# Patient Record
Sex: MALE | Race: WHITE
[De-identification: names, ages, dates, MRNs, and addresses within clinical notes are randomized per-mention and may not be internally consistent; named-entity substitution may affect disease eponyms.]

---

## 2021-01-13 ENCOUNTER — HOSPITAL ENCOUNTER (INPATIENT)
Dept: HOSPITAL 47 - EC | Age: 21
LOS: 7 days | Discharge: HOME | DRG: 684 | End: 2021-01-20
Attending: HOSPITALIST | Admitting: HOSPITALIST
Payer: COMMERCIAL

## 2021-01-13 VITALS — BODY MASS INDEX: 29.2 KG/M2

## 2021-01-13 DIAGNOSIS — A08.4: ICD-10-CM

## 2021-01-13 DIAGNOSIS — Z20.822: ICD-10-CM

## 2021-01-13 DIAGNOSIS — T38.0X5A: ICD-10-CM

## 2021-01-13 DIAGNOSIS — F41.9: ICD-10-CM

## 2021-01-13 DIAGNOSIS — E87.6: ICD-10-CM

## 2021-01-13 DIAGNOSIS — K59.00: ICD-10-CM

## 2021-01-13 DIAGNOSIS — Z71.3: ICD-10-CM

## 2021-01-13 DIAGNOSIS — N17.0: Primary | ICD-10-CM

## 2021-01-13 DIAGNOSIS — F17.210: ICD-10-CM

## 2021-01-13 DIAGNOSIS — I10: ICD-10-CM

## 2021-01-13 LAB
ALBUMIN SERPL-MCNC: 4.4 G/DL (ref 3.5–5)
ALP SERPL-CCNC: 61 U/L (ref 38–126)
ALT SERPL-CCNC: 11 U/L (ref 4–49)
ANION GAP SERPL CALC-SCNC: 10 MMOL/L
AST SERPL-CCNC: 24 U/L (ref 17–59)
BASOPHILS # BLD AUTO: 0.1 K/UL (ref 0–0.2)
BASOPHILS NFR BLD AUTO: 0 %
BUN SERPL-SCNC: 15 MG/DL (ref 9–20)
CALCIUM SPEC-MCNC: 9.2 MG/DL (ref 8.4–10.2)
CHLORIDE SERPL-SCNC: 110 MMOL/L (ref 98–107)
CO2 SERPL-SCNC: 21 MMOL/L (ref 22–30)
EOSINOPHIL # BLD AUTO: 0.2 K/UL (ref 0–0.7)
EOSINOPHIL NFR BLD AUTO: 1 %
ERYTHROCYTE [DISTWIDTH] IN BLOOD BY AUTOMATED COUNT: 5 M/UL (ref 4.3–5.9)
ERYTHROCYTE [DISTWIDTH] IN BLOOD: 12 % (ref 11.5–15.5)
GLUCOSE SERPL-MCNC: 99 MG/DL (ref 74–99)
HCT VFR BLD AUTO: 45.1 % (ref 39–53)
HGB BLD-MCNC: 15.9 GM/DL (ref 13–17.5)
LIPASE SERPL-CCNC: 27 U/L (ref 23–300)
LYMPHOCYTES # SPEC AUTO: 1.3 K/UL (ref 1–4.8)
LYMPHOCYTES NFR SPEC AUTO: 10 %
MCH RBC QN AUTO: 31.8 PG (ref 25–35)
MCHC RBC AUTO-ENTMCNC: 35.3 G/DL (ref 31–37)
MCV RBC AUTO: 90.1 FL (ref 80–100)
MONOCYTES # BLD AUTO: 0.8 K/UL (ref 0–1)
MONOCYTES NFR BLD AUTO: 6 %
NEUTROPHILS # BLD AUTO: 10.8 K/UL (ref 1.3–7.7)
NEUTROPHILS NFR BLD AUTO: 82 %
PH UR: 6 [PH] (ref 5–8)
PLATELET # BLD AUTO: 194 K/UL (ref 150–450)
POTASSIUM SERPL-SCNC: 4 MMOL/L (ref 3.5–5.1)
PROT SERPL-MCNC: 7.4 G/DL (ref 6.3–8.2)
PROT UR QL: (no result)
RBC UR QL: 1 /HPF (ref 0–5)
SODIUM SERPL-SCNC: 141 MMOL/L (ref 137–145)
SP GR UR: 1.01 (ref 1–1.03)
UROBILINOGEN UR QL STRIP: <2 MG/DL (ref ?–2)
WBC # BLD AUTO: 13.3 K/UL (ref 4–11)
WBC # UR AUTO: 2 /HPF (ref 0–5)

## 2021-01-13 PROCEDURE — 77012 CT SCAN FOR NEEDLE BIOPSY: CPT

## 2021-01-13 PROCEDURE — 85610 PROTHROMBIN TIME: CPT

## 2021-01-13 PROCEDURE — 86334 IMMUNOFIX E-PHORESIS SERUM: CPT

## 2021-01-13 PROCEDURE — 99284 EMERGENCY DEPT VISIT MOD MDM: CPT

## 2021-01-13 PROCEDURE — 84156 ASSAY OF PROTEIN URINE: CPT

## 2021-01-13 PROCEDURE — 87340 HEPATITIS B SURFACE AG IA: CPT

## 2021-01-13 PROCEDURE — 80048 BASIC METABOLIC PNL TOTAL CA: CPT

## 2021-01-13 PROCEDURE — 36415 COLL VENOUS BLD VENIPUNCTURE: CPT

## 2021-01-13 PROCEDURE — 82570 ASSAY OF URINE CREATININE: CPT

## 2021-01-13 PROCEDURE — 86706 HEP B SURFACE ANTIBODY: CPT

## 2021-01-13 PROCEDURE — 86335 IMMUNFIX E-PHORSIS/URINE/CSF: CPT

## 2021-01-13 PROCEDURE — 86850 RBC ANTIBODY SCREEN: CPT

## 2021-01-13 PROCEDURE — 86255 FLUORESCENT ANTIBODY SCREEN: CPT

## 2021-01-13 PROCEDURE — 85025 COMPLETE CBC W/AUTO DIFF WBC: CPT

## 2021-01-13 PROCEDURE — 96360 HYDRATION IV INFUSION INIT: CPT

## 2021-01-13 PROCEDURE — 86039 ANTINUCLEAR ANTIBODIES (ANA): CPT

## 2021-01-13 PROCEDURE — 80053 COMPREHEN METABOLIC PANEL: CPT

## 2021-01-13 PROCEDURE — 96361 HYDRATE IV INFUSION ADD-ON: CPT

## 2021-01-13 PROCEDURE — 76380 CAT SCAN FOLLOW-UP STUDY: CPT

## 2021-01-13 PROCEDURE — 86225 DNA ANTIBODY NATIVE: CPT

## 2021-01-13 PROCEDURE — 84100 ASSAY OF PHOSPHORUS: CPT

## 2021-01-13 PROCEDURE — 86038 ANTINUCLEAR ANTIBODIES: CPT

## 2021-01-13 PROCEDURE — 83690 ASSAY OF LIPASE: CPT

## 2021-01-13 PROCEDURE — 76770 US EXAM ABDO BACK WALL COMP: CPT

## 2021-01-13 PROCEDURE — 86900 BLOOD TYPING SEROLOGIC ABO: CPT

## 2021-01-13 PROCEDURE — 83735 ASSAY OF MAGNESIUM: CPT

## 2021-01-13 PROCEDURE — 86803 HEPATITIS C AB TEST: CPT

## 2021-01-13 PROCEDURE — 86160 COMPLEMENT ANTIGEN: CPT

## 2021-01-13 PROCEDURE — 86901 BLOOD TYPING SEROLOGIC RH(D): CPT

## 2021-01-13 PROCEDURE — 81001 URINALYSIS AUTO W/SCOPE: CPT

## 2021-01-13 PROCEDURE — 84132 ASSAY OF SERUM POTASSIUM: CPT

## 2021-01-13 RX ADMIN — ACETAMINOPHEN PRN MG: 325 TABLET, FILM COATED ORAL at 23:04

## 2021-01-13 RX ADMIN — CEFAZOLIN SCH MLS/HR: 330 INJECTION, POWDER, FOR SOLUTION INTRAMUSCULAR; INTRAVENOUS at 18:03

## 2021-01-13 NOTE — ED
General Adult HPI





- General


Chief complaint: Back Pain/Injury


Stated complaint: Back Pressure/kidney issues


Time Seen by Provider: 01/13/21 15:44


Source: patient, RN notes reviewed


Mode of arrival: ambulatory


Limitations: no limitations





- History of Present Illness


Initial comments: 





20-year-old male presents to the emergency room for a pressure feeling in his 

mid back.  Patient states this started yesterday morning.  States he does not 

recall straining his back.  The only thing that may have caused it is getting in

and out of his friend's truck.  He denies pain worsening with movement.  He 

denies nausea vomiting diarrhea.  States he is hungry and has any nausea.  

Patient went to [facility] earlier today and was told he was in 

renal failure and to go home and follow-up with his doctor.  He reports they did

do a CAT scan.Patient has no other complaints at this time including shortness 

of breath, chest pain, abdominal pain, nausea or vomiting, headache, or visual 

changes.





- Related Data


                                Home Medications











 Medication  Instructions  Recorded  Confirmed


 


No Known Home Medications  01/13/21 01/13/21











                                    Allergies











Allergy/AdvReac Type Severity Reaction Status Date / Time


 


No Known Allergies Allergy   Verified 01/13/21 16:22














Review of Systems


ROS Statement: 


Those systems with pertinent positive or pertinent negative responses have been 

documented in the HPI.





ROS Other: All systems not noted in ROS Statement are negative.





Past Medical History


Past Medical History: No Reported History


History of Any Multi-Drug Resistant Organisms: None Reported


Past Surgical History: No Surgical Hx Reported


Past Psychological History: No Psychological Hx Reported


Smoking Status: Current every day smoker


Past Alcohol Use History: Occasional


Past Drug Use History: Marijuana





General Exam


Limitations: no limitations


General appearance: alert


Head exam: Present: atraumatic, normal inspection


Eye exam: Present: normal appearance, PERRL, EOMI.  Absent: scleral icterus, 

conjunctival injection


ENT exam: Present: normal exam, mucous membranes moist


Neck exam: Present: normal inspection, full ROM.  Absent: tenderness


Respiratory exam: Present: normal lung sounds bilaterally.  Absent: respiratory 

distress


Cardiovascular Exam: Present: regular rate, normal rhythm


GI/Abdominal exam: Present: soft, normal bowel sounds.  Absent: distended, 

tenderness, guarding, rebound, rigid


Back exam: Absent: CVA tenderness (R), CVA tenderness (L), paraspinal tenderness

(Patient does state the pain feels better when I press on the paraspinal 

muscles), vertebral tenderness





Course


                                   Vital Signs











  01/13/21 01/13/21





  15:38 18:04


 


Temperature 98 F 


 


Pulse Rate 82 85


 


Respiratory 18 16





Rate  


 


Blood Pressure 148/85 151/94


 


O2 Sat by Pulse 98 98





Oximetry  














Medical Decision Making





- Medical Decision Making





Vitals are stable.  White blood cell come 13.3.  Creatinine 1.9.  I did review 

previous records and creatinine earlier today was 1.8.  CT was negative.  Urin

alysis shows 2+ protein.  At this time patient will be admitted for further 

management of acute kidney injury.  Patient does not have a primary care doctor.

 I did speak with Jairo from [facility], he will accept this admission





- Lab Data


Result diagrams: 


                                 01/13/21 16:11





                                 01/13/21 16:11


                                   Lab Results











  01/13/21 01/13/21 01/13/21 Range/Units





  16:11 16:11 16:11 


 


WBC  13.3 H    (4.0-11.0)  k/uL


 


RBC  5.00    (4.30-5.90)  m/uL


 


Hgb  15.9    (13.0-17.5)  gm/dL


 


Hct  45.1    (39.0-53.0)  %


 


MCV  90.1    (80.0-100.0)  fL


 


MCH  31.8    (25.0-35.0)  pg


 


MCHC  35.3    (31.0-37.0)  g/dL


 


RDW  12.0    (11.5-15.5)  %


 


Plt Count  194    (150-450)  k/uL


 


MPV  9.0    


 


Neutrophils %  82    %


 


Lymphocytes %  10    %


 


Monocytes %  6    %


 


Eosinophils %  1    %


 


Basophils %  0    %


 


Neutrophils #  10.8 H    (1.3-7.7)  k/uL


 


Lymphocytes #  1.3    (1.0-4.8)  k/uL


 


Monocytes #  0.8    (0-1.0)  k/uL


 


Eosinophils #  0.2    (0-0.7)  k/uL


 


Basophils #  0.1    (0-0.2)  k/uL


 


Sodium    141  (137-145)  mmol/L


 


Potassium    4.0  (3.5-5.1)  mmol/L


 


Chloride    110 H  ()  mmol/L


 


Carbon Dioxide    21 L  (22-30)  mmol/L


 


Anion Gap    10  mmol/L


 


BUN    15  (9-20)  mg/dL


 


Creatinine    1.90 H  (0.66-1.25)  mg/dL


 


Est GFR (CKD-EPI)AfAm    58  (>60 ml/min/1.73 sqM)  


 


Est GFR (CKD-EPI)NonAf    50  (>60 ml/min/1.73 sqM)  


 


Glucose    99  (74-99)  mg/dL


 


Calcium    9.2  (8.4-10.2)  mg/dL


 


Total Bilirubin    1.1  (0.2-1.3)  mg/dL


 


AST    24  (17-59)  U/L


 


ALT    11  (4-49)  U/L


 


Alkaline Phosphatase    61  ()  U/L


 


Total Protein    7.4  (6.3-8.2)  g/dL


 


Albumin    4.4  (3.5-5.0)  g/dL


 


Lipase    27  ()  U/L


 


Urine Color   Light Yellow   


 


Urine Appearance   Clear   (Clear)  


 


Urine pH   6.0   (5.0-8.0)  


 


Ur Specific Gravity   1.006   (1.001-1.035)  


 


Urine Protein   2+ H   (Negative)  


 


Urine Glucose (UA)   Negative   (Negative)  


 


Urine Ketones   Trace H   (Negative)  


 


Urine Blood   Trace H   (Negative)  


 


Urine Nitrite   Negative   (Negative)  


 


Urine Bilirubin   Negative   (Negative)  


 


Urine Urobilinogen   <2.0   (<2.0)  mg/dL


 


Ur Leukocyte Esterase   Negative   (Negative)  


 


Urine RBC   1   (0-5)  /hpf


 


Urine WBC   2   (0-5)  /hpf


 


Urine Bacteria   Rare H   (None)  /hpf














Disposition


Clinical Impression: 


 KENNY (acute kidney injury)





Disposition: ADMITTED AS IP TO THIS HOSP


Condition: Fair


Is patient prescribed a controlled substance at d/c from ED?: No


Time of Disposition: 17:08

## 2021-01-14 LAB — PROT/CREAT UR-RTO: 1.85

## 2021-01-14 RX ADMIN — ONDANSETRON PRN MG: 2 INJECTION INTRAMUSCULAR; INTRAVENOUS at 08:14

## 2021-01-14 RX ADMIN — CEFAZOLIN SCH: 330 INJECTION, POWDER, FOR SOLUTION INTRAMUSCULAR; INTRAVENOUS at 18:27

## 2021-01-14 RX ADMIN — MORPHINE SULFATE PRN MG: 4 INJECTION, SOLUTION INTRAMUSCULAR; INTRAVENOUS at 11:13

## 2021-01-14 RX ADMIN — MORPHINE SULFATE PRN MG: 4 INJECTION, SOLUTION INTRAMUSCULAR; INTRAVENOUS at 21:54

## 2021-01-14 RX ADMIN — FAMOTIDINE SCH MG: 10 INJECTION, SOLUTION INTRAVENOUS at 20:23

## 2021-01-14 RX ADMIN — CEFAZOLIN SCH MLS/HR: 330 INJECTION, POWDER, FOR SOLUTION INTRAMUSCULAR; INTRAVENOUS at 06:30

## 2021-01-14 RX ADMIN — ACETAMINOPHEN PRN MG: 325 TABLET, FILM COATED ORAL at 18:33

## 2021-01-14 RX ADMIN — CEFAZOLIN SCH: 330 INJECTION, POWDER, FOR SOLUTION INTRAMUSCULAR; INTRAVENOUS at 20:24

## 2021-01-14 RX ADMIN — HEPARIN SODIUM SCH UNIT: 5000 INJECTION, SOLUTION INTRAVENOUS; SUBCUTANEOUS at 20:23

## 2021-01-14 NOTE — US
EXAMINATION TYPE: US kidneys/renal and bladder

 

DATE OF EXAM: 1/14/2021

 

COMPARISON: NONE

 

CLINICAL HISTORY: RF. Abnormal renal labs

 

EXAM MEASUREMENTS:

 

Right Kidney:  11.8 x 6.5 x 6.5 cm

Left Kidney: 11.9 x 6.7 x 6.1 cm

 

 

 

 

Right Kidney: No evidence of hydro, appeared hyperechoic when compared to liver  

Left Kidney: No evidence of hydro  

Bladder: Empty- Pt voided just prior to exam

 

No hydronephrosis or nephrolithiasis. No solid or cystic renal mass. Bladder limited by incomplete di
stention.

 

 

IMPRESSION:

Increased echogenicity of the right kidney is nonspecific could be seen with medical renal disease co
rrelate clinically. No evidence of obstruction.

## 2021-01-14 NOTE — CONS
CONSULTATION



REASON FOR CONSULT:

Renal failure.



HISTORY OF PRESENT ILLNESS:

Patient is a 20-year-old male with no significant past medical history of any kidney

disease.  He was admitted to the hospital with a history of back pain.  He actually

presented to [facility] yesterday with the same complaints.  He had a CT

of the abdomen done which did not reveal any acute findings.  At that time patient was

noted to have an elevated creatinine, and he was asked to follow up with his regular

doctor as outpatient.  The patient did admit to taking Motrin for about 2 days prior to

admission.  He did have some constipation.  He denied any previous kidney-related

issues.  The patient does not see a physician on a regular basis.  However, he was

never informed of any abnormality in the urine or his labs.



This admission patient is noted to have a creatinine of 1.9, and he states that it was

1.9 yesterday at [facility], too. The patient states he was not given any IV fluids

over there.  He has been on IV fluids since admission yesterday.  UA shows 2+ protein,

trace blood and ketones.  Patient's blood pressure has not been low; in fact, it is

slightly on the higher side.  There is no prior history of hypertension.  No

significant WBCs noted on his urinalysis.



No history of joint pains.  No rashes.  No history of connective tissue diseases

previously or in the family.



PAST MEDICAL HISTORY:

None.



PAST SURGICAL HISTORY:

None.



SOCIAL HISTORY:

Positive for smoking as well as marijuana smoking.



MEDICATIONS:

None prior to admission.



REVIEW OF SYSTEMS:

As per HPI.  Other systems negative.



ALLERGIES:

NONE.



PHYSICAL EXAMINATION:

Patient is comfortable, awake, alert, oriented x3, not in any acute distress.  Blood

pressure was 150/84, heart rate 68 per minute. He is afebrile.

EXAMINATION OF THE HEART: S1 and S2.

EXAMINATION OF LUNGS: Bilateral breath sounds are heard.

ABDOMEN:  Soft, non-tender.

Examination of lower extremities shows no evidence of edema.

CNS exam is grossly intact.



LABS:

Labs show sodium 141, potassium 4.0, chloride 110. CO2 is 21, BUN 15, creatinine 1.9,

hemoglobin 15.9 g/dL. UA shows 2+ protein, no WBCs, trace blood.



ASSESSMENT:

1. Acute kidney injury, although no previous labs are available for comparison, and

    creatinine was the same per patient at [facility] yesterday.  I will

    continue with aggressive hydration and repeat labs in a.m., following which he

    could be discharged and follow up as outpatient.  Etiology for the acute kidney

    injury is most likely related to the use of NSAIDs.  However, we need to rule out

    underlying chronic kidney disease, given the degree of proteinuria.  I will check a

    protein/creatinine ratio and we will check a 24-hour urine for protein as

    outpatient.  Initial serologies will be ordered as well, and patient is advised to

    avoid use of any nonsteroidal anti-inflammatory agents.

2. Proteinuria with elevated creatinine in a 20-year-old male with a history of use of

    NSAIDs. Check protein/creatinine ratio and ultimately 24-hour urine, which can be

    done as outpatient.  Check baseline serologies.  Avoid NSAIDs. Patient will need

    followup as outpatient for possible kidney biopsy if his proteinuria and acute

    kidney injury persist.  I will also review the CT scan done at [facility].

3. Back pain, most likely musculoskeletal, currently improving.  There is no evidence

    of urinary tract infection.  No stones were seen on the CT scan done at [facility].  I will order an ultrasound of the kidneys.

4. Elevated blood pressure.  Continue to monitor for now.  May continue with the IV

    fluids.

5. Constipation; seems to have improved.



PLAN:

Increase IV fluids. Repeat labs in a.m.  Check ultrasound.  Check UA.  Check

protein/creatinine ratio.  Check baseline serologies.  Maintain IV hydration overnight

and repeat labs in a.m.  Patient will need close monitoring and followup as outpatient.



Thank you for this consultation.  Will continue to follow the patient with you during

his hospitalization.





MMODL / IJN: [id number] / Job#: [id number]

## 2021-01-14 NOTE — P.HPIM
History of Present Illness





This is a pleasant 20 years old male with no significant past medical history, 

he smokes marijuana and cigarettes.  Patient used to follow-up with Dr. [place]edo 

pediatrician, he hasn't been seen her for a while however she does not have 

adult PCP.  He presents because of back pain to [facility] yesterday he says

that his pain on both sides on the lower third of his back, felt about 4-5/10 in

severity and currently there are 2-3, he felt these pains well he was waking up 

from sleep.  At [facility] as per patient he had CT of the abdomen and 

pelvis which was unremarkable, there he was told he has acute kidney injury and 

they discharged him with recommendation to follow up as an outpatient however he

decided to come to this hospital at [facility].  On further questionnaire patient 

states he has diarrhea about 4-5 times per day he says that he had a 4 couple 

days, they are soft rather than watery associated with vomiting and , abdominal 

cramps,.  Periumbilical to about 6-7/10 in severity, nonradiating improved with 

defecation, now he is abdominal pain free.





Patient vitals are stable.  Labs showing WBCs of 13.3 K, less of CBC is 

unremarkable, creatinine up at 1.9.  Urinalysis showing 2+ protein, trace 

ketones, trace blood, rare bacteria.











Review of Systems





CONSTITUTIONAL: No fever, no malaise, no fatigue. 


HEENT: No recent visual problems or hearing problems. Denied any sore throat. 


CARDIOVASCULAR: No  orthopnea, PND, no palpitations, no syncope. 


PULMONARY: No shortness of breath, no cough, no hemoptysis. 


GASTROINTESTINAL: No abdominal distention. Normoactive bowel sounds. 


NEUROLOGICAL: No headaches, no weakness, no numbness. 


HEMATOLOGICAL: Denies any bleeding or petechiae. 


GENITOURINARY: Denies any burning micturition, frequency, or urgency. 


MUSCULOSKELETAL/RHEUMATOLOGICAL: Denies any joint pain, swelling, or any muscle 

pain. 


ENDOCRINE: Denies any polyuria or polydipsia.











Past Medical History


Past Medical History: No Reported History


History of Any Multi-Drug Resistant Organisms: None Reported


Past Surgical History: No Surgical Hx Reported


Past Psychological History: No Psychological Hx Reported


Smoking Status: Current some day smoker


Past Alcohol Use History: Occasional


Past Drug Use History: Marijuana





Medications and Allergies


                                Home Medications











 Medication  Instructions  Recorded  Confirmed  Type


 


No Known Home Medications  01/13/21 01/13/21 History








                                    Allergies











Allergy/AdvReac Type Severity Reaction Status Date / Time


 


No Known Allergies Allergy   Verified 01/13/21 16:22














Physical Exam


Vitals: 


                                   Vital Signs











  Temp Pulse Pulse Resp BP BP Pulse Ox


 


 01/14/21 09:00    68  18   


 


 01/14/21 08:08  98.1 F   68  18   150/84  98


 


 01/14/21 02:05    75    


 


 01/14/21 02:04  97.9 F   75  16   154/83  98


 


 01/13/21 21:06  98 F   88  16   151/84  98


 


 01/13/21 18:04   85   16  151/94   98


 


 01/13/21 15:38  98 F  82   18  148/85   98








                                Intake and Output











 01/13/21 01/14/21 01/14/21





 22:59 06:59 14:59


 


Other:   


 


  Voiding Method Toilet Toilet Toilet


 


  # Voids 1 1 


 


  Weight 95.254 kg  














GENERAL: The patient is alert and oriented x3, not in any acute distress. Well 

developed, well nourished. 


HEENT: Pupils are round and equally reacting to light. EOMI. No scleral icterus.

No conjunctival pallor. Normocephalic, atraumatic. No pharyngeal erythema. No 

thyromegaly. 


CARDIOVASCULAR: S1 and S2 present. No murmurs, rubs, or gallops. 


PULMONARY: Chest is clear to auscultation, no wheezing or crackles. 


ABDOMEN: Soft, nontender, nondistended, normoactive bowel sounds. No palpable 

organomegaly. 


-MUSCULOSKELETAL: No joint swelling or deformity.  Mild bilateral paraspinal 

lower back tenderness 


EXTREMITIES: No cyanosis, clubbing, or pedal edema. 


NEUROLOGICAL: Gross neurological examination did not reveal any focal deficits. 


SKIN: No rashes. No petechiae








Results


CBC & Chem 7: 


                                 01/13/21 16:11





                                 01/13/21 16:11


Labs: 


                  Abnormal Lab Results - Last 24 Hours (Table)











  01/13/21 01/13/21 01/13/21 Range/Units





  16:11 16:11 16:11 


 


WBC  13.3 H    (4.0-11.0)  k/uL


 


Neutrophils #  10.8 H    (1.3-7.7)  k/uL


 


Chloride    110 H  ()  mmol/L


 


Carbon Dioxide    21 L  (22-30)  mmol/L


 


Creatinine    1.90 H  (0.66-1.25)  mg/dL


 


Urine Protein   2+ H   (Negative)  


 


Urine Ketones   Trace H   (Negative)  


 


Urine Blood   Trace H   (Negative)  


 


Urine Bacteria   Rare H   (None)  /hpf














Thrombosis Risk Factor Assmnt





- Choose All That Apply


Any of the Below Risk Factors Present?: No


Other Risk Factors: No


Thrombosis Risk Factor Assessment Level: Very Low Risk





Assessment and Plan


Assessment: 





Possible acute gastroenteritis, mostly viral.


Acute kidney injury


Proteinuria











Plan: 





This is a pleasant 20 years old male who presents with acute kidney injury and 

proteinuria.  Continue with IV hydration at 150 mm per hour, monitor creatinine 

and electrolytes.  Also recommend to check coronavirus and C. diff, also we'll 

check stool studies.  Check procalcitonin.  Follow-up recommendation by 

nephrologist


Labs and medication were reviewed..  Continue same treatment.  Continue with 

symptomatic treatment.  Resume home medication.  Monitor lytes and vitals.  DVT 

and GI prophylaxis.  Further recommendations depends on the clinical course of 

the patient


DVT prophylaxis: Subcutaneous heparin


GI Prophylaxis: Pepcid

## 2021-01-15 LAB
ANION GAP SERPL CALC-SCNC: 5 MMOL/L
BASOPHILS # BLD AUTO: 0.1 K/UL (ref 0–0.2)
BASOPHILS NFR BLD AUTO: 0 %
BUN SERPL-SCNC: 20 MG/DL (ref 9–20)
CALCIUM SPEC-MCNC: 8.3 MG/DL (ref 8.4–10.2)
CHLORIDE SERPL-SCNC: 113 MMOL/L (ref 98–107)
CO2 SERPL-SCNC: 23 MMOL/L (ref 22–30)
DSDNA AB SER QL: NEGATIVE
DSDNA AB TITR SER: <1 IU/ML
EOSINOPHIL # BLD AUTO: 0.1 K/UL (ref 0–0.7)
EOSINOPHIL NFR BLD AUTO: 1 %
ERYTHROCYTE [DISTWIDTH] IN BLOOD BY AUTOMATED COUNT: 4.24 M/UL (ref 4.3–5.9)
ERYTHROCYTE [DISTWIDTH] IN BLOOD: 12 % (ref 11.5–15.5)
GLUCOSE SERPL-MCNC: 116 MG/DL (ref 74–99)
HBV SURFACE AB SERPL IA-ACNC: 3.5 MIU/ML
HCT VFR BLD AUTO: 39.2 % (ref 39–53)
HGB BLD-MCNC: 13.4 GM/DL (ref 13–17.5)
LYMPHOCYTES # SPEC AUTO: 1.2 K/UL (ref 1–4.8)
LYMPHOCYTES NFR SPEC AUTO: 10 %
MCH RBC QN AUTO: 31.7 PG (ref 25–35)
MCHC RBC AUTO-ENTMCNC: 34.3 G/DL (ref 31–37)
MCV RBC AUTO: 92.4 FL (ref 80–100)
MONOCYTES # BLD AUTO: 0.8 K/UL (ref 0–1)
MONOCYTES NFR BLD AUTO: 7 %
NEUTROPHILS # BLD AUTO: 10 K/UL (ref 1.3–7.7)
NEUTROPHILS NFR BLD AUTO: 82 %
PLATELET # BLD AUTO: 132 K/UL (ref 150–450)
POTASSIUM SERPL-SCNC: 4 MMOL/L (ref 3.5–5.1)
SODIUM SERPL-SCNC: 141 MMOL/L (ref 137–145)
WBC # BLD AUTO: 12.3 K/UL (ref 4–11)

## 2021-01-15 RX ADMIN — ACETAMINOPHEN PRN MG: 325 TABLET, FILM COATED ORAL at 02:12

## 2021-01-15 RX ADMIN — HEPARIN SODIUM SCH: 5000 INJECTION, SOLUTION INTRAVENOUS; SUBCUTANEOUS at 07:54

## 2021-01-15 RX ADMIN — MORPHINE SULFATE PRN MG: 4 INJECTION, SOLUTION INTRAMUSCULAR; INTRAVENOUS at 08:00

## 2021-01-15 RX ADMIN — CEFAZOLIN SCH MLS/HR: 330 INJECTION, POWDER, FOR SOLUTION INTRAMUSCULAR; INTRAVENOUS at 15:31

## 2021-01-15 RX ADMIN — FAMOTIDINE SCH MG: 10 INJECTION, SOLUTION INTRAVENOUS at 08:00

## 2021-01-15 RX ADMIN — CEFAZOLIN SCH MLS/HR: 330 INJECTION, POWDER, FOR SOLUTION INTRAMUSCULAR; INTRAVENOUS at 02:08

## 2021-01-15 RX ADMIN — CEFAZOLIN SCH MLS/HR: 330 INJECTION, POWDER, FOR SOLUTION INTRAMUSCULAR; INTRAVENOUS at 06:56

## 2021-01-15 RX ADMIN — MORPHINE SULFATE PRN MG: 4 INJECTION, SOLUTION INTRAMUSCULAR; INTRAVENOUS at 18:25

## 2021-01-15 NOTE — PN
PROGRESS NOTE



Patient is seen for followup for acute kidney injury.  He is maintained on IV fluids.

Labs from today show serum creatinine increased to 4.37 mg/dL.  The patient states he

has been voiding.  His blood pressure has been slightly on the high side.  The patient

stated that he had significant pain last night.  He has not had any significant chest

pain or cough.



PHYSICAL EXAMINATION:

On examination today, blood pressure was 158/100, heart rate 68 per minute.  Patient is

afebrile.

EXAMINATION OF THE HEART: S1, S2.

EXAMINATION OF THE LUNGS:  Decreased breath sounds at bases.

Abdomen is soft.  Minimal tenderness noted.

Examination of lower extremities shows no significant edema.

CNS exam is grossly intact.



LABS:

Labs show sodium of 141, potassium 4.0, chloride 113, CO2 is 23, BUN 20, creatinine

4.37.  Protein creatinine ratio noted to be 1.85.  So far CLIFF is positive and all other

serologies are negative.  ANCA is pending.



ASSESSMENT:

1. Acute kidney injury, most likely underlying acute GN.  Renal function has

    significantly worsened with creatinine up to 4.3 in 2 days.  The patient is

    maintained on IV fluids.  No evidence of obstruction on the ultrasound.  His UA

    shows 2+ protein.  Protein creatinine ratio is at about 1.8.  The patient needs to

    have a kidney biopsy. I will start him empirically on steroids in the meantime.  I

    will decrease the IV fluids to about 70 mL an hour as well.

2. Elevated blood pressure.  Add Norvasc 5 mg daily.  Expect improvement in blood

    pressure with decreasing the IV fluids.



PLAN:

Decrease IV fluids.  The patient needs to have a kidney biopsy as soon as possible.  We

will arrange for it, possibly on Monday.  In the meantime, add oral steroids.  Add

Norvasc for elevated blood pressure as well.





MMODL / IJN: [id number] / Job#: [id number]

## 2021-01-15 NOTE — P.PN
Subjective





This is a pleasant 20 years old male with no significant past medical history, 

he smokes marijuana and cigarettes.  Patient used to follow-up with Dr. Toledo 

pediatrician, he hasn't been seen her for a while however she does not have 

adult PCP.  He presents because of back pain to [facility] yesterday he says

that his pain on both sides on the lower third of his back, felt about 4-5/10 in

severity and currently there are 2-3, he felt these pains well he was waking up 

from sleep.  At [facility] as per patient he had CT of the abdomen and 

pelvis which was unremarkable, there he was told he has acute kidney injury and 

they discharged him with recommendation to follow up as an outpatient however he

decided to come to this hospital at [facility].  On further questionnaire patient 

states he has diarrhea about 4-5 times per day he says that he had a 4 couple 

days, they are soft rather than watery associated with vomiting and , abdominal 

cramps,.  Periumbilical to about 6-7/10 in severity, nonradiating improved with 

defecation, now he is abdominal pain free.


Patient vitals are stable.  Labs showing WBCs of 13.3 K, less of CBC is unre

markable, creatinine up at 1.9.  Urinalysis showing 2+ protein, trace ketones, 

trace blood, rare bacteria.





01/15/2021


Patient has no urinary complaints, no dysuria or urgency.  No suprapubic pain or

tenderness.  No abdominal pain.  He didn't have bowel movement since yesterday. 

He still complaining of from mild low back pain on both sides since yesterday.  

No diarrhea, no nausea vomiting, no abdominal pain.  No fever.  Blood pressure 

is 158/100, patient is afebrile.


WBCs trending down slowly to 12.3 K, creatinine is trending up 1.9  to 4.3 

today.  Urine from yesterday showing 2+ protein.  Urine c.  reatinine is 42, 

protein/creatinine ratio is within the reference range of 1.8.  PNA screen is 

positive.  Double-strand DNA antibodies is negative and anti-DNA antibodies less

than 1.  hepatitis BS antigen and hepatitis BS antibodies are nonreactive 


 renal ultrasound:increased echogenicity of the right kidney is nonspecific 

could be seen with medical renal disease, correlated clinically.  No evidence of

obstruction.  Nephrologist team on the case and follow the patient closely


                               Active Medications











Generic Name Dose Route Start Last Admin





  Trade Name Freq  PRN Reason Stop Dose Admin


 


Acetaminophen  650 mg  01/13/21 18:09  01/15/21 02:12





  Acetaminophen Tab 325 Mg Tab  PO   650 mg





  Q6HR PRN   Administration





  Fever and/ or Pain  


 


Famotidine  20 mg  01/14/21 21:00  01/15/21 08:00





  Famotidine 20 Mg/2 Ml Vial  IV   20 mg





  Q12HR KINGS   Administration


 


Heparin Sodium (Porcine)  5,000 unit  01/14/21 21:00  01/15/21 07:54





  Heparin Sodium,Porcine 5,000 Unit/Ml 1 Ml Vial  SQ   Not Given





  Q12HR KINGS  


 


Sodium Chloride  1,000 mls @ 150 mls/hr  01/14/21 11:45  01/15/21 06:56





  Saline 0.9%  IV   150 mls/hr





  .[id number] KINGS   Administration


 


Lorazepam  0.5 mg  01/13/21 18:08  01/15/21 04:10





  Lorazepam 2 Mg/Ml Inj  IV   0.5 mg





  Q6HR PRN   Administration





  Anxiety  


 


Morphine Sulfate  4 mg  01/13/21 17:09  01/15/21 08:00





  Morphine Sulfate 4 Mg/Ml Syringe  IV   4 mg





  Q4HR PRN   Administration





  Severe Pain  


 


Naloxone HCl  0.2 mg  01/13/21 17:09 





  Naloxone 0.4 Mg/Ml 1 Ml Vial  IV  





  Q2M PRN  





  Opioid Reversal  


 


Ondansetron HCl  4 mg  01/13/21 17:09  01/14/21 08:14





  Ondansetron 4 Mg/2 Ml Vial  IVP   4 mg





  Q8HR PRN   Administration





  Nausea And Vomiting  














Objective





- Vital Signs


Vital signs: 


                                   Vital Signs











Temp  97.3 F L  01/15/21 07:50


 


Pulse  68   01/15/21 08:49


 


Resp  16   01/15/21 08:49


 


BP  158/100   01/15/21 07:50


 


Pulse Ox  98   01/15/21 07:50








                                 Intake & Output











 01/14/21 01/15/21 01/15/21





 18:59 06:59 18:59


 


Intake Total 2610  


 


Output Total  300 


 


Balance 2610 -300 


 


Intake:   


 


  Intake, IV Titration 450  





  Amount   


 


    Sodium Chloride 0.9% 1, 450  





    000 ml @ 75 mls/hr IV .   





    [id number] KINGS Rx#:[id number]   


 


  Oral 2160  


 


Output:   


 


  Emesis  300 


 


Other:   


 


  Voiding Method Toilet Toilet Toilet


 


  # Voids 2 2 














- Labs


CBC & Chem 7: 


                                 01/15/21 09:19





                                 01/15/21 09:19


Labs: 


                  Abnormal Lab Results - Last 24 Hours (Table)











  01/14/21 01/14/21 01/15/21 Range/Units





  14:47 16:25 09:19 


 


WBC    12.3 H  (4.0-11.0)  k/uL


 


RBC    4.24 L  (4.30-5.90)  m/uL


 


Plt Count    132 L  (150-450)  k/uL


 


Neutrophils #    10.0 H  (1.3-7.7)  k/uL


 


Chloride     ()  mmol/L


 


Creatinine     (0.66-1.25)  mg/dL


 


Glucose     (74-99)  mg/dL


 


Calcium     (8.4-10.2)  mg/dL


 


U Random Total Protein   80 H   (<12)  mg/dL


 


CLIFF Screen  POSITIVE A    (NEGATIVE)  














  01/15/21 Range/Units





  09:19 


 


WBC   (4.0-11.0)  k/uL


 


RBC   (4.30-5.90)  m/uL


 


Plt Count   (150-450)  k/uL


 


Neutrophils #   (1.3-7.7)  k/uL


 


Chloride  113 H  ()  mmol/L


 


Creatinine  4.37 H  (0.66-1.25)  mg/dL


 


Glucose  116 H  (74-99)  mg/dL


 


Calcium  8.3 L  (8.4-10.2)  mg/dL


 


U Random Total Protein   (<12)  mg/dL


 


CLIFF Screen   (NEGATIVE)  














Assessment and Plan


Assessment: 








Acute kidney injury with worsening creatinine.  Could be related to recent NSAID

use


Proteinuria


Possible recent acute gastroenteritis, mostly viral.








Plan: 





This is a pleasant 20 years old male who presents with acute kidney injury and 

proteinuria.  Continue with IV hydration at 150 ml per hour, monitor creatinine 

and electrolytes Follow-up recommendation by nephrologist, continue with workup 

for proteinuria as per nephrologist recommendation 


Labs and medication were reviewed..  Continue same treatment.  Continue with 

symptomatic treatment.  Resume home medication.  Monitor lytes and vitals.  DVT 

and GI prophylaxis.  Further recommendations depends on the clinical course of 

the patient


DVT prophylaxis: Subcutaneous heparin


GI Prophylaxis: Pepcid

## 2021-01-16 LAB
ANION GAP SERPL CALC-SCNC: 8 MMOL/L
BASOPHILS # BLD AUTO: 0 K/UL (ref 0–0.2)
BASOPHILS NFR BLD AUTO: 0 %
BUN SERPL-SCNC: 21 MG/DL (ref 9–20)
CALCIUM SPEC-MCNC: 8.8 MG/DL (ref 8.4–10.2)
CHLORIDE SERPL-SCNC: 115 MMOL/L (ref 98–107)
CO2 SERPL-SCNC: 21 MMOL/L (ref 22–30)
EOSINOPHIL # BLD AUTO: 0 K/UL (ref 0–0.7)
EOSINOPHIL NFR BLD AUTO: 0 %
ERYTHROCYTE [DISTWIDTH] IN BLOOD BY AUTOMATED COUNT: 4.27 M/UL (ref 4.3–5.9)
ERYTHROCYTE [DISTWIDTH] IN BLOOD: 11.8 % (ref 11.5–15.5)
GLUCOSE SERPL-MCNC: 105 MG/DL (ref 74–99)
HCT VFR BLD AUTO: 38.5 % (ref 39–53)
HGB BLD-MCNC: 13.7 GM/DL (ref 13–17.5)
LYMPHOCYTES # SPEC AUTO: 1.2 K/UL (ref 1–4.8)
LYMPHOCYTES NFR SPEC AUTO: 11 %
MCH RBC QN AUTO: 32 PG (ref 25–35)
MCHC RBC AUTO-ENTMCNC: 35.5 G/DL (ref 31–37)
MCV RBC AUTO: 90.3 FL (ref 80–100)
MONOCYTES # BLD AUTO: 0.8 K/UL (ref 0–1)
MONOCYTES NFR BLD AUTO: 8 %
NEUTROPHILS # BLD AUTO: 8.9 K/UL (ref 1.3–7.7)
NEUTROPHILS NFR BLD AUTO: 80 %
PLATELET # BLD AUTO: 132 K/UL (ref 150–450)
POTASSIUM SERPL-SCNC: 4.2 MMOL/L (ref 3.5–5.1)
SODIUM SERPL-SCNC: 144 MMOL/L (ref 137–145)
WBC # BLD AUTO: 11.2 K/UL (ref 4–11)

## 2021-01-16 RX ADMIN — CEFAZOLIN SCH MLS/HR: 330 INJECTION, POWDER, FOR SOLUTION INTRAMUSCULAR; INTRAVENOUS at 22:13

## 2021-01-16 RX ADMIN — HEPARIN SODIUM SCH UNIT: 5000 INJECTION, SOLUTION INTRAVENOUS; SUBCUTANEOUS at 22:12

## 2021-01-16 RX ADMIN — FAMOTIDINE SCH MG: 10 INJECTION, SOLUTION INTRAVENOUS at 00:43

## 2021-01-16 RX ADMIN — PANTOPRAZOLE SODIUM SCH: 40 TABLET, DELAYED RELEASE ORAL at 09:31

## 2021-01-16 RX ADMIN — MORPHINE SULFATE PRN MG: 4 INJECTION, SOLUTION INTRAMUSCULAR; INTRAVENOUS at 00:42

## 2021-01-16 RX ADMIN — HEPARIN SODIUM SCH UNIT: 5000 INJECTION, SOLUTION INTRAVENOUS; SUBCUTANEOUS at 00:44

## 2021-01-16 RX ADMIN — HEPARIN SODIUM SCH UNIT: 5000 INJECTION, SOLUTION INTRAVENOUS; SUBCUTANEOUS at 07:45

## 2021-01-16 RX ADMIN — CEFAZOLIN SCH MLS/HR: 330 INJECTION, POWDER, FOR SOLUTION INTRAMUSCULAR; INTRAVENOUS at 04:03

## 2021-01-16 RX ADMIN — FAMOTIDINE SCH MG: 10 INJECTION, SOLUTION INTRAVENOUS at 07:45

## 2021-01-16 NOTE — P.PN
Subjective





Patient is seen in follow for acute kidney injury.  Creatinine up to 4.37 

yesterday.  CLIFF positive.  No gross hematuria.  Oral intake fair.  Kidney biopsy

pending.  Blood pressure on the higher side.





Vital signs are stable.


General: The patient appeared well nourished and normally developed. 


HEENT: Head exam is unremarkable. Neck is without jugular venous distension.


LUNGS: Breath sounds decreased.


HEART: Rate and Rhythm are regular. 


ABDOMEN: Soft, nontender.


EXTREMITITES: No edema.





Objective





- Vital Signs


Vital signs: 


                                   Vital Signs











Temp  98.3 F   01/16/21 03:00


 


Pulse  77   01/16/21 03:00


 


Resp  16   01/16/21 03:00


 


BP  163/109   01/16/21 03:00


 


Pulse Ox  99   01/16/21 03:00








                                 Intake & Output











 01/15/21 01/16/21 01/16/21





 18:59 06:59 18:59


 


Intake Total 400  


 


Balance 400  


 


Intake:   


 


  Oral 400  


 


Other:   


 


  Voiding Method Toilet Toilet 


 


  # Voids 2 5 














- Labs


CBC & Chem 7: 


                                 01/15/21 09:19





                                 01/15/21 09:19


Labs: 


                  Abnormal Lab Results - Last 24 Hours (Table)











  01/15/21 01/15/21 Range/Units





  09:19 09:19 


 


WBC  12.3 H   (4.0-11.0)  k/uL


 


RBC  4.24 L   (4.30-5.90)  m/uL


 


Plt Count  132 L   (150-450)  k/uL


 


Neutrophils #  10.0 H   (1.3-7.7)  k/uL


 


Chloride   113 H  ()  mmol/L


 


Creatinine   4.37 H  (0.66-1.25)  mg/dL


 


Glucose   116 H  (74-99)  mg/dL


 


Calcium   8.3 L  (8.4-10.2)  mg/dL














Assessment and Plan


Plan: 





Assessment:


1.  Acute kidney injury, concern for GN.  Creatinine 4.37 as of yesterday.  It 

was 1.9 on admission 01/13/2021.  Labs from today pending.  CLIFF positive.  UPC 

1.8 g. No hydronephrosis and kidney ultrasound.


2.  Benign hypertension.  Exacerbated by steroids.





Plan:


Maintain normal saline at 70 mL an hour.


Maintain prednisone (started 01/15/2021)


I will change Pepcid to Protonix.


Follow-up pending serologies.


Kidney biopsy scheduled for Monday.


DDAVP prior to biopsy.


Maintain amlodipine.


Add hydralazine 25 mg 3 times daily.

## 2021-01-16 NOTE — P.PN
Subjective





This is a pleasant 20 years old male with no significant past medical history, 

he smokes marijuana and cigarettes.  Patient used to follow-up with Dr. Toledo 

pediatrician, he hasn't been seen her for a while however she does not have 

adult PCP.  He presents because of back pain to [facility] yesterday he says

that his pain on both sides on the lower third of his back, felt about 4-5/10 in

severity and currently there are 2-3, he felt these pains well he was waking up 

from sleep.  At [facility] as per patient he had CT of the abdomen and 

pelvis which was unremarkable, there he was told he has acute kidney injury and 

they discharged him with recommendation to follow up as an outpatient however he

decided to come to this hospital at [facility].  On further questionnaire patient 

states he has diarrhea about 4-5 times per day he says that he had a 4 couple 

days, they are soft rather than watery associated with vomiting and , abdominal 

cramps,.  Periumbilical to about 6-7/10 in severity, nonradiating improved with 

defecation, now he is abdominal pain free.


Patient vitals are stable.  Labs showing WBCs of 13.3 K, less of CBC is unre

markable, creatinine up at 1.9.  Urinalysis showing 2+ protein, trace ketones, 

trace blood, rare bacteria.





01/15/2021


Patient has no urinary complaints, no dysuria or urgency.  No suprapubic pain or

tenderness.  No abdominal pain.  He didn't have bowel movement since yesterday. 

He still complaining of from mild low back pain on both sides since yesterday.  

No diarrhea, no nausea vomiting, no abdominal pain.  No fever.  Blood pressure 

is 158/100, patient is afebrile.


WBCs trending down slowly to 12.3 K, creatinine is trending up 1.9  to 4.3 

today.  Urine from yesterday showing 2+ protein.  Urine c.  reatinine is 42, 

protein/creatinine ratio is within the reference range of 1.8.  PNA screen is 

positive.  Double-strand DNA antibodies is negative and anti-DNA antibodies less

than 1.  hepatitis BS antigen and hepatitis BS antibodies are nonreactive 


 renal ultrasound:increased echogenicity of the right kidney is nonspecific 

could be seen with medical renal disease, correlated clinically.  No evidence of

obstruction.  Nephrologist team on the case and follow the patient closely





01/16/2021


Patient with known new complaint today, yesterday he was started on a prednisone

and since then his back pain is improved and patient is happy about that, he has

slow appetite but no abdominal pain, nausea urinary symptoms, blood pressure is 

on the high side, Norvasc was started yesterday and hydralazine is admitted 

today by nephrology team.


Also he is on normal saline at 70 mL/h


Labs today show a stable creatinine of 4.4 and patient is aware.  His WBC is 

11.2 but also is on steroids.


Given his protein in the urine and positive CLIFF acute glomerulonephritis is 

suspected and kidney biopsy is scheduled for Monday.











                               Active Medications











Generic Name Dose Route Start Last Admin





  Trade Name Freq  PRN Reason Stop Dose Admin


 


Acetaminophen  650 mg  01/13/21 18:09  01/15/21 02:12





  Acetaminophen Tab 325 Mg Tab  PO   650 mg





  Q6HR PRN   Administration





  Fever and/ or Pain  


 


Amlodipine Besylate  5 mg  01/16/21 09:00  01/16/21 07:45





  Amlodipine 5 Mg Tab  PO   5 mg





  DAILY KINGS   Administration


 


Heparin Sodium (Porcine)  5,000 unit  01/14/21 21:00  01/16/21 07:45





  Heparin Sodium,Porcine 5,000 Unit/Ml 1 Ml Vial  SQ   5,000 unit





  Q12HR KINGS   Administration


 


Hydralazine HCl  10 mg  01/16/21 08:18 





  Hydralazine Hcl 20 Mg/Ml 1 Ml Vial  IVP  





  Q6HR PRN  





  sbp >160  


 


Hydralazine HCl  25 mg  01/16/21 09:00  01/16/21 15:02





  Hydralazine Hcl 25 Mg Tab  PO   25 mg





  TID KINGS   Administration


 


Sodium Chloride  1,000 mls @ 70 mls/hr  01/14/21 11:45  01/16/21 04:03





  Saline 0.9%  IV   70 mls/hr





  .[id number] KINGS   Administration


 


Lorazepam  0.5 mg  01/13/21 18:08  01/16/21 07:59





  Lorazepam 2 Mg/Ml Inj  IV   0.5 mg





  Q6HR PRN   Administration





  Anxiety  


 


Morphine Sulfate  4 mg  01/13/21 17:09  01/16/21 00:42





  Morphine Sulfate 4 Mg/Ml Syringe  IV   4 mg





  Q4HR PRN   Administration





  Severe Pain  


 


Naloxone HCl  0.2 mg  01/13/21 17:09 





  Naloxone 0.4 Mg/Ml 1 Ml Vial  IV  





  Q2M PRN  





  Opioid Reversal  


 


Ondansetron HCl  4 mg  01/13/21 17:09  01/14/21 08:14





  Ondansetron 4 Mg/2 Ml Vial  IVP   4 mg





  Q8HR PRN   Administration





  Nausea And Vomiting  


 


Pantoprazole Sodium  40 mg  01/16/21 08:30  01/16/21 09:31





  Pantoprazole 40 Mg Tablet  PO   Not Given





  [id number]  


 


Prednisone  60 mg  01/15/21 14:30  01/16/21 07:45





  Prednisone 20 Mg Tab  PO   60 mg





  DAILY KINGS   Administration














Objective





- Vital Signs


Vital signs: 


                                   Vital Signs











Temp  97.5 F L  01/16/21 15:00


 


Pulse  92   01/16/21 15:00


 


Resp  16   01/16/21 15:00


 


BP  161/88   01/16/21 15:00


 


Pulse Ox  98   01/16/21 15:00








                                 Intake & Output











 01/16/21 01/16/21 01/17/21





 06:59 18:59 06:59


 


Intake Total  1000 


 


Balance  1000 


 


Intake:   


 


  Oral  1000 


 


Other:   


 


  Voiding Method Toilet Toilet 


 


  # Voids 5 3 














- Exam





GENERAL: The patient is alert and oriented x3, not in any acute distress. Well 

developed, well nourished. 


HEENT: Pupils are round and equally reacting to light. EOMI. No scleral icterus.

No conjunctival pallor. Normocephalic, atraumatic. No pharyngeal erythema. No 

thyromegaly. 


CARDIOVASCULAR: S1 and S2 present. No murmurs, rubs, or gallops. 


PULMONARY: Chest is clear to auscultation, no wheezing or crackles. 


ABDOMEN: Soft, nontender, nondistended, normoactive bowel sounds. No palpable 

organomegaly. 


MUSCULOSKELETAL: No joint swelling or deformity. 


EXTREMITIES: No cyanosis, clubbing, or pedal edema. 


NEUROLOGICAL: Gross neurological examination did not reveal any focal deficits. 


SKIN: No rashes. no petechiae.





- Labs


CBC & Chem 7: 


                                 01/16/21 07:45





                                 01/16/21 07:45


Labs: 


                  Abnormal Lab Results - Last 24 Hours (Table)











  01/16/21 01/16/21 Range/Units





  07:45 07:45 


 


WBC  11.2 H   (4.0-11.0)  k/uL


 


RBC  4.27 L   (4.30-5.90)  m/uL


 


Hct  38.5 L   (39.0-53.0)  %


 


Plt Count  132 L   (150-450)  k/uL


 


Neutrophils #  8.9 H   (1.3-7.7)  k/uL


 


Chloride   115 H  ()  mmol/L


 


Carbon Dioxide   21 L  (22-30)  mmol/L


 


BUN   21 H  (9-20)  mg/dL


 


Creatinine   4.48 H  (0.66-1.25)  mg/dL


 


Glucose   105 H  (74-99)  mg/dL














Assessment and Plan


Assessment: 








Acute kidney injury, mostly related to Glomerulonephritis 


Proteinuria


Possible recent acute gastroenteritis, mostly viral.


High blood pressure, hypertension.








Plan: 





This is a pleasant 20 years old male who presents with acute kidney injury and 

proteinuria.  Continue with IV hydration at 70ml per hour, monitor creatinine 

and electrolytes Follow-up recommendation by nephrologist, patient is scheduled 

for kidney biopsy on Monday recommendation .  Continue with prednisone as per 

nephrologist


Labs and medication were reviewed..  Continue same treatment.  Continue with 

symptomatic treatment.  Resume home medication.  Monitor lytes and vitals.  DVT 

and GI prophylaxis.  Further recommendations depends on the clinical course of 

the patient


DVT prophylaxis: Subcutaneous heparin


GI Prophylaxis: Pepcid

## 2021-01-17 LAB
ALBUMIN SERPL-MCNC: 3.6 G/DL (ref 3.5–5)
ALP SERPL-CCNC: 39 U/L (ref 38–126)
ALT SERPL-CCNC: 20 U/L (ref 4–49)
ANION GAP SERPL CALC-SCNC: 9 MMOL/L
AST SERPL-CCNC: 20 U/L (ref 17–59)
BASOPHILS # BLD AUTO: 0 K/UL (ref 0–0.2)
BASOPHILS NFR BLD AUTO: 0 %
BUN SERPL-SCNC: 24 MG/DL (ref 9–20)
CALCIUM SPEC-MCNC: 8.8 MG/DL (ref 8.4–10.2)
CHLORIDE SERPL-SCNC: 113 MMOL/L (ref 98–107)
CO2 SERPL-SCNC: 23 MMOL/L (ref 22–30)
EOSINOPHIL # BLD AUTO: 0.1 K/UL (ref 0–0.7)
EOSINOPHIL NFR BLD AUTO: 1 %
ERYTHROCYTE [DISTWIDTH] IN BLOOD BY AUTOMATED COUNT: 4.35 M/UL (ref 4.3–5.9)
ERYTHROCYTE [DISTWIDTH] IN BLOOD: 12 % (ref 11.5–15.5)
GLUCOSE SERPL-MCNC: 91 MG/DL (ref 74–99)
HCT VFR BLD AUTO: 39.3 % (ref 39–53)
HGB BLD-MCNC: 13.8 GM/DL (ref 13–17.5)
LYMPHOCYTES # SPEC AUTO: 1.9 K/UL (ref 1–4.8)
LYMPHOCYTES NFR SPEC AUTO: 17 %
MAGNESIUM SPEC-SCNC: 2 MG/DL (ref 1.6–2.3)
MCH RBC QN AUTO: 31.7 PG (ref 25–35)
MCHC RBC AUTO-ENTMCNC: 35.1 G/DL (ref 31–37)
MCV RBC AUTO: 90.4 FL (ref 80–100)
MONOCYTES # BLD AUTO: 0.9 K/UL (ref 0–1)
MONOCYTES NFR BLD AUTO: 8 %
NEUTROPHILS # BLD AUTO: 8 K/UL (ref 1.3–7.7)
NEUTROPHILS NFR BLD AUTO: 72 %
PLATELET # BLD AUTO: 143 K/UL (ref 150–450)
POTASSIUM SERPL-SCNC: 3.9 MMOL/L (ref 3.5–5.1)
PROT SERPL-MCNC: 6.2 G/DL (ref 6.3–8.2)
SODIUM SERPL-SCNC: 145 MMOL/L (ref 137–145)
WBC # BLD AUTO: 11.1 K/UL (ref 4–11)

## 2021-01-17 RX ADMIN — HEPARIN SODIUM SCH UNIT: 5000 INJECTION, SOLUTION INTRAVENOUS; SUBCUTANEOUS at 09:19

## 2021-01-17 RX ADMIN — CEFAZOLIN SCH: 330 INJECTION, POWDER, FOR SOLUTION INTRAMUSCULAR; INTRAVENOUS at 20:25

## 2021-01-17 RX ADMIN — ACETAMINOPHEN PRN MG: 325 TABLET, FILM COATED ORAL at 05:59

## 2021-01-17 RX ADMIN — PANTOPRAZOLE SODIUM SCH MG: 40 TABLET, DELAYED RELEASE ORAL at 06:56

## 2021-01-17 RX ADMIN — HEPARIN SODIUM SCH UNIT: 5000 INJECTION, SOLUTION INTRAVENOUS; SUBCUTANEOUS at 22:39

## 2021-01-17 RX ADMIN — MORPHINE SULFATE PRN MG: 4 INJECTION, SOLUTION INTRAMUSCULAR; INTRAVENOUS at 09:28

## 2021-01-17 NOTE — P.PN
Subjective





Patient is seen in follow for acute kidney injury.  Creatinine up to 4.48 

yesterday.  CLIFF positive.  No gross hematuria.  Oral intake fair.  Kidney biopsy

tomorrow.  Blood pressure better controlled.





Vital signs are stable.


General: The patient appeared well nourished and normally developed. 


HEENT: Head exam is unremarkable. Neck is without jugular venous distension.


LUNGS: Breath sounds decreased.


HEART: Rate and Rhythm are regular. 


ABDOMEN: Soft, nontender.


EXTREMITITES: No edema.





Objective





- Vital Signs


Vital signs: 


                                   Vital Signs











Temp  98.5 F   01/17/21 03:00


 


Pulse  67   01/17/21 03:00


 


Resp  16   01/17/21 03:00


 


BP  119/78   01/17/21 03:00


 


Pulse Ox  98   01/17/21 03:00








                                 Intake & Output











 01/16/21 01/17/21 01/17/21





 18:59 06:59 18:59


 


Intake Total 1000  


 


Balance 1000  


 


Intake:   


 


  Oral 1000  


 


Other:   


 


  Voiding Method Toilet  


 


  # Voids 3 1 














- Labs


CBC & Chem 7: 


                                 01/16/21 07:45





                                 01/16/21 07:45


Labs: 


                  Abnormal Lab Results - Last 24 Hours (Table)











  01/16/21 01/16/21 Range/Units





  07:45 07:45 


 


WBC  11.2 H   (4.0-11.0)  k/uL


 


RBC  4.27 L   (4.30-5.90)  m/uL


 


Hct  38.5 L   (39.0-53.0)  %


 


Plt Count  132 L   (150-450)  k/uL


 


Neutrophils #  8.9 H   (1.3-7.7)  k/uL


 


Chloride   115 H  ()  mmol/L


 


Carbon Dioxide   21 L  (22-30)  mmol/L


 


BUN   21 H  (9-20)  mg/dL


 


Creatinine   4.48 H  (0.66-1.25)  mg/dL


 


Glucose   105 H  (74-99)  mg/dL














Assessment and Plan


Plan: 





Assessment:


1.  Acute kidney injury, concern for GN.  Creatinine 4.48 as of yesterday.  It 

was 1.9 on admission 01/13/2021.  Labs from today pending.  CLIFF positive.  UPC 

1.8 g. No hydronephrosis and kidney ultrasound.


2.  Benign hypertension.  Exacerbated by steroids.  Better controlled.





Plan:


Maintain normal saline at 70 mL an hour.


Maintain prednisone (started 01/15/2021)


Maintain PPI.


Follow-up pending serologies.


Kidney biopsy scheduled for Monday.


DDAVP prior to biopsy.

## 2021-01-17 NOTE — P.PN
Subjective





This is a pleasant 20 years old male with no significant past medical history, 

he smokes marijuana and cigarettes.  Patient used to follow-up with Dr. Toledo 

pediatrician, he hasn't been seen her for a while however she does not have 

adult PCP.  He presents because of back pain to [facility] yesterday he says

that his pain on both sides on the lower third of his back, felt about 4-5/10 in

severity and currently there are 2-3, he felt these pains well he was waking up 

from sleep.  At [facility] as per patient he had CT of the abdomen and 

pelvis which was unremarkable, there he was told he has acute kidney injury and 

they discharged him with recommendation to follow up as an outpatient however he

decided to come to this hospital at [facility].  On further questionnaire patient 

states he has diarrhea about 4-5 times per day he says that he had a 4 couple 

days, they are soft rather than watery associated with vomiting and , abdominal 

cramps,.  Periumbilical to about 6-7/10 in severity, nonradiating improved with 

defecation, now he is abdominal pain free.


Patient vitals are stable.  Labs showing WBCs of 13.3 K, less of CBC is unre

markable, creatinine up at 1.9.  Urinalysis showing 2+ protein, trace ketones, 

trace blood, rare bacteria.





01/15/2021


Patient has no urinary complaints, no dysuria or urgency.  No suprapubic pain or

tenderness.  No abdominal pain.  He didn't have bowel movement since yesterday. 

He still complaining of from mild low back pain on both sides since yesterday.  

No diarrhea, no nausea vomiting, no abdominal pain.  No fever.  Blood pressure 

is 158/100, patient is afebrile.


WBCs trending down slowly to 12.3 K, creatinine is trending up 1.9  to 4.3 

today.  Urine from yesterday showing 2+ protein.  Urine c.  reatinine is 42, 

protein/creatinine ratio is within the reference range of 1.8.  PNA screen is 

positive.  Double-strand DNA antibodies is negative and anti-DNA antibodies less

than 1.  hepatitis BS antigen and hepatitis BS antibodies are nonreactive 


 renal ultrasound:increased echogenicity of the right kidney is nonspecific 

could be seen with medical renal disease, correlated clinically.  No evidence of

obstruction.  Nephrologist team on the case and follow the patient closely





01/16/2021


Patient with known new complaint today, yesterday he was started on a prednisone

and since then his back pain is improved and patient is happy about that, he has

slow appetite but no abdominal pain, nausea urinary symptoms, blood pressure is 

on the high side, Norvasc was started yesterday and hydralazine is admitted 

today by nephrology team.


Also he is on normal saline at 70 mL/h


Labs today show a stable creatinine of 4.4 and patient is aware.  His WBC is 

11.2 but also is on steroids.


Given his protein in the urine and positive CLIFF acute glomerulonephritis is 

suspected and kidney biopsy is scheduled for Monday.





01/17/2021


Patient was complaining of from low back pain and asking for stronger pain 

medication, Norco has been added, no other specific complaint.  Vitals are 

stable and blood pressure is 119/78


Labs from today are still pending, creatinine yesterday was 4.4.


Acute abdomen nephritis is suspected and patient is a scheduled for kidney 

biopsy tomorrow per nephrologist


Serology tests are pending CA) CA and immunofixation antibody and the serum and

urine 





Objective





- Vital Signs


Vital signs: 


                                   Vital Signs











Temp  98.5 F   01/17/21 03:00


 


Pulse  67   01/17/21 03:00


 


Resp  16   01/17/21 03:00


 


BP  119/78   01/17/21 03:00


 


Pulse Ox  98   01/17/21 03:00








                                 Intake & Output











 01/16/21 01/17/21 01/17/21





 18:59 06:59 18:59


 


Intake Total 1000  


 


Balance 1000  


 


Intake:   


 


  Oral 1000  


 


Other:   


 


  Voiding Method Toilet  


 


  # Voids 3 1 














- Exam





GENERAL: The patient is alert and oriented x3, not in any acute distress. Well 

developed, well nourished. 


HEENT: Pupils are round and equally reacting to light. EOMI. No scleral icterus.

No conjunctival pallor. Normocephalic, atraumatic. No pharyngeal erythema. No 

thyromegaly. 


CARDIOVASCULAR: S1 and S2 present. No murmurs, rubs, or gallops. 


PULMONARY: Chest is clear to auscultation, no wheezing or crackles. 


ABDOMEN: Soft, nontender, nondistended, normoactive bowel sounds. No palpable 

organomegaly. 


MUSCULOSKELETAL: No joint swelling or deformity. 


EXTREMITIES: No cyanosis, clubbing, or pedal edema. 


NEUROLOGICAL: Gross neurological examination did not reveal any focal deficits. 


SKIN: No rashes. no petechiae.





- Labs


CBC & Chem 7: 


                                 01/16/21 07:45





                                 01/16/21 07:45





Assessment and Plan


Assessment: 








Acute kidney injury, mostly related to Glomerulonephritis 


Proteinuria


Possible recent acute gastroenteritis, mostly viral.


High blood pressure, hypertension.








Plan: 





This is a pleasant 20 years old male who presents with acute kidney injury and 

proteinuria.  Continue with IV hydration at 70ml per hour, monitor creatinine 

and electrolytes Follow-up recommendation by nephrologist, patient is scheduled 

for kidney biopsy on Monday .   Continue with prednisone as per nephrologist


Labs and medication were reviewed..  Continue same treatment.  Continue with 

symptomatic treatment.  Resume home medication.  Monitor lytes and vitals.  DVT 

and GI prophylaxis.  Further recommendations depends on the clinical course of 

the patient


DVT prophylaxis: Subcutaneous heparin


GI Prophylaxis: Pepcid

## 2021-01-18 LAB
ALBUMIN SERPL-MCNC: 3.9 G/DL (ref 3.5–5)
ALP SERPL-CCNC: 55 U/L (ref 38–126)
ALT SERPL-CCNC: 23 U/L (ref 4–49)
ANION GAP SERPL CALC-SCNC: 11 MMOL/L
AST SERPL-CCNC: 17 U/L (ref 17–59)
BASOPHILS # BLD AUTO: 0 K/UL (ref 0–0.2)
BASOPHILS NFR BLD AUTO: 0 %
BUN SERPL-SCNC: 21 MG/DL (ref 9–20)
C-ANCA TITR SER: (no result) TITER
CALCIUM SPEC-MCNC: 8.9 MG/DL (ref 8.4–10.2)
CHLORIDE SERPL-SCNC: 111 MMOL/L (ref 98–107)
CO2 SERPL-SCNC: 23 MMOL/L (ref 22–30)
EOSINOPHIL # BLD AUTO: 0.1 K/UL (ref 0–0.7)
EOSINOPHIL NFR BLD AUTO: 1 %
ERYTHROCYTE [DISTWIDTH] IN BLOOD BY AUTOMATED COUNT: 4.6 M/UL (ref 4.3–5.9)
ERYTHROCYTE [DISTWIDTH] IN BLOOD: 11.9 % (ref 11.5–15.5)
GLUCOSE SERPL-MCNC: 97 MG/DL (ref 74–99)
HCT VFR BLD AUTO: 41.4 % (ref 39–53)
HGB BLD-MCNC: 14.6 GM/DL (ref 13–17.5)
INR PPP: 1.1 (ref ?–1.2)
LYMPHOCYTES # SPEC AUTO: 2.1 K/UL (ref 1–4.8)
LYMPHOCYTES NFR SPEC AUTO: 17 %
MAGNESIUM SPEC-SCNC: 1.7 MG/DL (ref 1.6–2.3)
MCH RBC QN AUTO: 31.7 PG (ref 25–35)
MCHC RBC AUTO-ENTMCNC: 35.3 G/DL (ref 31–37)
MCV RBC AUTO: 89.9 FL (ref 80–100)
MONOCYTES # BLD AUTO: 0.8 K/UL (ref 0–1)
MONOCYTES NFR BLD AUTO: 7 %
NEUTROPHILS # BLD AUTO: 8.7 K/UL (ref 1.3–7.7)
NEUTROPHILS NFR BLD AUTO: 73 %
PLATELET # BLD AUTO: 158 K/UL (ref 150–450)
POTASSIUM SERPL-SCNC: 3.4 MMOL/L (ref 3.5–5.1)
PROT SERPL-MCNC: 6.5 G/DL (ref 6.3–8.2)
PT BLD: 11.3 SEC (ref 9–12)
SODIUM SERPL-SCNC: 145 MMOL/L (ref 137–145)
WBC # BLD AUTO: 11.9 K/UL (ref 4–11)

## 2021-01-18 RX ADMIN — HEPARIN SODIUM SCH: 5000 INJECTION, SOLUTION INTRAVENOUS; SUBCUTANEOUS at 08:38

## 2021-01-18 RX ADMIN — CEFAZOLIN SCH: 330 INJECTION, POWDER, FOR SOLUTION INTRAMUSCULAR; INTRAVENOUS at 08:38

## 2021-01-18 RX ADMIN — CEFAZOLIN SCH MLS/HR: 330 INJECTION, POWDER, FOR SOLUTION INTRAMUSCULAR; INTRAVENOUS at 03:22

## 2021-01-18 RX ADMIN — MORPHINE SULFATE PRN MG: 2 INJECTION, SOLUTION INTRAMUSCULAR; INTRAVENOUS at 13:57

## 2021-01-18 RX ADMIN — MORPHINE SULFATE PRN MG: 2 INJECTION, SOLUTION INTRAMUSCULAR; INTRAVENOUS at 21:00

## 2021-01-18 RX ADMIN — HEPARIN SODIUM SCH: 5000 INJECTION, SOLUTION INTRAVENOUS; SUBCUTANEOUS at 21:19

## 2021-01-18 RX ADMIN — CEFAZOLIN SCH: 330 INJECTION, POWDER, FOR SOLUTION INTRAMUSCULAR; INTRAVENOUS at 19:28

## 2021-01-18 RX ADMIN — PANTOPRAZOLE SODIUM SCH MG: 40 TABLET, DELAYED RELEASE ORAL at 07:23

## 2021-01-18 RX ADMIN — HEPARIN SODIUM SCH UNIT: 5000 INJECTION, SOLUTION INTRAVENOUS; SUBCUTANEOUS at 21:01

## 2021-01-18 NOTE — P.PN
Subjective





Patient is seen in follow for acute kidney injury.  Creatinine peaked at 4.48 

this admission and was down to 3.21 as of yesterday.  CLIFF positive.  No gross 

hematuria.  Oral intake fair.  Kidney biopsy today.  





Vital signs are stable.


General: The patient appeared well nourished and normally developed. 


HEENT: Head exam is unremarkable. Neck is without jugular venous distension.


LUNGS: Breath sounds decreased.


HEART: Rate and Rhythm are regular. 


ABDOMEN: Soft, nontender.


EXTREMITITES: No edema.





Objective





- Vital Signs


Vital signs: 


                                   Vital Signs











Temp  98.3 F   01/18/21 03:00


 


Pulse  85   01/18/21 03:00


 


Resp  18   01/18/21 03:00


 


BP  160/92   01/18/21 03:00


 


Pulse Ox  97   01/18/21 03:00








                                 Intake & Output











 01/17/21 01/18/21 01/18/21





 18:59 06:59 18:59


 


Intake Total 1040  


 


Balance 1040  


 


Intake:   


 


  Intake, IV Titration 420  





  Amount   


 


    Sodium Chloride 0.9% 1, 420  





    000 ml @ 70 mls/hr IV .   





    [id number] [facility] Rx#:[id number]   


 


  Oral 620  


 


Other:   


 


  Voiding Method Toilet  


 


  # Voids  0 














- Labs


CBC & Chem 7: 


                                 01/17/21 09:31





                                 01/17/21 09:31


Labs: 


                  Abnormal Lab Results - Last 24 Hours (Table)











  01/17/21 01/17/21 Range/Units





  09:31 09:31 


 


WBC  11.1 H   (4.0-11.0)  k/uL


 


Plt Count  143 L   (150-450)  k/uL


 


Neutrophils #  8.0 H   (1.3-7.7)  k/uL


 


Chloride   113 H  ()  mmol/L


 


BUN   24 H  (9-20)  mg/dL


 


Creatinine   3.21 H  (0.66-1.25)  mg/dL


 


Phosphorus   5.5 H  (2.5-4.5)  mg/dL


 


Total Protein   6.2 L  (6.3-8.2)  g/dL














Assessment and Plan


Plan: 





Assessment:


1.  Acute kidney injury, concern for GN.  Creatinine peaked at 4.48 - 3.21 as of

yesterday.  It was 1.9 on admission 01/13/2021.  Labs from today pending.  CLIFF 

positive.  UPC 1.8 g. No hydronephrosis and kidney ultrasound.


2.  Benign hypertension.  Exacerbated by steroids.  Stable.





Plan:


Decreased normal saline to 50 mL an hour.


Maintain prednisone (started 01/15/2021)


Maintain PPI.


Follow-up pending serologies.


Kidney biopsy today.


DDAVP prior to biopsy.

## 2021-01-18 NOTE — CT
EXAMINATION TYPE: CT discontinued procedure

 

DATE OF EXAM: 1/18/2021

 

COMPARISON:

 

HISTORY: Acute kidney injury

 

CT DLP: 351 mGycm

Automated exposure control for dose reduction was used. Helical imaging through the kidneys in prepar
ation for kidney biopsy

 

FINDINGS: 

Biopsy was aborted due to inability to control patient's hypertension

 

IMPRESSION: 

DISCONTINUED RENAL BIOPSY

## 2021-01-18 NOTE — P.PN
Subjective





This is a pleasant 20 years old male with no significant past medical history, 

he smokes marijuana and cigarettes.  Patient used to follow-up with Dr. Toledo 

pediatrician, he hasn't been seen her for a while however she does not have 

adult PCP.  He presents because of back pain to [facility] yesterday he says

that his pain on both sides on the lower third of his back, felt about 4-5/10 in

severity and currently there are 2-3, he felt these pains well he was waking up 

from sleep.  At [facility] as per patient he had CT of the abdomen and 

pelvis which was unremarkable, there he was told he has acute kidney injury and 

they discharged him with recommendation to follow up as an outpatient however he

decided to come to this hospital at [facility].  On further questionnaire patient 

states he has diarrhea about 4-5 times per day he says that he had a 4 couple 

days, they are soft rather than watery associated with vomiting and , abdominal 

cramps,.  Periumbilical to about 6-7/10 in severity, nonradiating improved with 

defecation, now he is abdominal pain free.


Patient vitals are stable.  Labs showing WBCs of 13.3 K, less of CBC is unre

markable, creatinine up at 1.9.  Urinalysis showing 2+ protein, trace ketones, 

trace blood, rare bacteria.





01/15/2021


Patient has no urinary complaints, no dysuria or urgency.  No suprapubic pain or

tenderness.  No abdominal pain.  He didn't have bowel movement since yesterday. 

He still complaining of from mild low back pain on both sides since yesterday.  

No diarrhea, no nausea vomiting, no abdominal pain.  No fever.  Blood pressure 

is 158/100, patient is afebrile.


WBCs trending down slowly to 12.3 K, creatinine is trending up 1.9  to 4.3 

today.  Urine from yesterday showing 2+ protein.  Urine c.  reatinine is 42, 

protein/creatinine ratio is within the reference range of 1.8.  PNA screen is 

positive.  Double-strand DNA antibodies is negative and anti-DNA antibodies less

than 1.  hepatitis BS antigen and hepatitis BS antibodies are nonreactive 


 renal ultrasound:increased echogenicity of the right kidney is nonspecific 

could be seen with medical renal disease, correlated clinically.  No evidence of

obstruction.  Nephrologist team on the case and follow the patient closely





01/16/2021


Patient with known new complaint today, yesterday he was started on a prednisone

and since then his back pain is improved and patient is happy about that, he has

slow appetite but no abdominal pain, nausea urinary symptoms, blood pressure is 

on the high side, Norvasc was started yesterday and hydralazine is admitted 

today by nephrology team.


Also he is on normal saline at 70 mL/h


Labs today show a stable creatinine of 4.4 and patient is aware.  His WBC is 

11.2 but also is on steroids.


Given his protein in the urine and positive CLIFF acute glomerulonephritis is 

suspected and kidney biopsy is scheduled for Monday.





01/17/2021


Patient was complaining of from low back pain and asking for stronger pain 

medication, Norco has been added, no other specific complaint.  Vitals are 

stable and blood pressure is 119/78


Labs from today are still pending, creatinine yesterday was 4.4.


Acute abdomen nephritis is suspected and patient is a scheduled for kidney 

biopsy tomorrow per nephrologist


Serology tests are pending CA) CA and immunofixation antibody and the serum and

urine 





01/18/2021


Patient with minimal lower back pain, no other complaint.  Vitals stable, blood 

pressure 151/89, last eye was 138/70. 


Creatinine today is trending down to 2.4.  Potassium 3.4, WBC is 11.9 K 


 IV fluid was lowered to 50 mL/h per nephrologist, continue with prednisone, 

patient is a scheduled for kidney biopsy today and I talked to the patient and 

he looks agreeable to this plan.





Objective





- Vital Signs


Vital signs: 


                                   Vital Signs











Temp  97.8 F   01/18/21 08:07


 


Pulse  77   01/18/21 08:07


 


Resp  16   01/18/21 08:07


 


BP  151/89   01/18/21 08:07


 


Pulse Ox  98   01/18/21 08:07








                                 Intake & Output











 01/17/21 01/18/21 01/18/21





 18:59 06:59 18:59


 


Intake Total 1040  


 


Balance 1040  


 


Intake:   


 


  Intake, IV Titration 420  





  Amount   


 


    Sodium Chloride 0.9% 1, 420  





    000 ml @ 70 mls/hr IV .   





    [id number] [facility] Rx#:[id number]   


 


  Oral 620  


 


Other:   


 


  Voiding Method Toilet  


 


  # Voids  0 














- Exam





GENERAL: The patient is alert and oriented x3, not in any acute distress. Well 

developed, well nourished. 


HEENT: Pupils are round and equally reacting to light. EOMI. No scleral icterus.

No conjunctival pallor. Normocephalic, atraumatic. No pharyngeal erythema. No 

thyromegaly. 


CARDIOVASCULAR: S1 and S2 present. No murmurs, rubs, or gallops. 


PULMONARY: Chest is clear to auscultation, no wheezing or crackles. 


ABDOMEN: Soft, nontender, nondistended, normoactive bowel sounds. No palpable 

organomegaly. 


MUSCULOSKELETAL: No joint swelling or deformity. 


EXTREMITIES: No cyanosis, clubbing, or pedal edema. 


NEUROLOGICAL: Gross neurological examination did not reveal any focal deficits. 


SKIN: No rashes. no petechiae.





- Labs


CBC & Chem 7: 


                                 01/18/21 07:29





                                 01/18/21 07:29


Labs: 


                  Abnormal Lab Results - Last 24 Hours (Table)











  01/17/21 01/17/21 01/18/21 Range/Units





  09:31 09:31 07:29 


 


WBC  11.1 H   11.9 H  (4.0-11.0)  k/uL


 


Plt Count  143 L    (150-450)  k/uL


 


Neutrophils #  8.0 H   8.7 H  (1.3-7.7)  k/uL


 


Potassium     (3.5-5.1)  mmol/L


 


Chloride   113 H   ()  mmol/L


 


BUN   24 H   (9-20)  mg/dL


 


Creatinine   3.21 H   (0.66-1.25)  mg/dL


 


Phosphorus   5.5 H   (2.5-4.5)  mg/dL


 


Total Protein   6.2 L   (6.3-8.2)  g/dL














  01/18/21 Range/Units





  07:29 


 


WBC   (4.0-11.0)  k/uL


 


Plt Count   (150-450)  k/uL


 


Neutrophils #   (1.3-7.7)  k/uL


 


Potassium  3.4 L  (3.5-5.1)  mmol/L


 


Chloride  111 H  ()  mmol/L


 


BUN  21 H  (9-20)  mg/dL


 


Creatinine  2.46 H  (0.66-1.25)  mg/dL


 


Phosphorus   (2.5-4.5)  mg/dL


 


Total Protein   (6.3-8.2)  g/dL














Assessment and Plan


Assessment: 








Acute kidney injury, mostly related to Glomerulonephritis 


Proteinuria


Possible recent acute gastroenteritis, mostly viral.


High blood pressure, hypertension.








Plan: 





This is a pleasant 20 years old male who presents with acute kidney injury and 

proteinuria.  Continue with IV hydration at 70ml per hour, monitor creatinine 

and electrolytes Follow-up recommendation by nephrologist, patient is scheduled 

for kidney biopsy on Monday .   Continue with prednisone as per nephrologist


Labs and medication were reviewed..  Continue same treatment.  Continue with 

symptomatic treatment.  Resume home medication.  Monitor lytes and vitals.  DVT 

and GI prophylaxis.  Further recommendations depends on the clinical course of 

the patient


DVT prophylaxis: Subcutaneous heparin


GI Prophylaxis: Pepcid

## 2021-01-19 LAB
ANION GAP SERPL CALC-SCNC: 9 MMOL/L
BASOPHILS # BLD AUTO: 0 K/UL (ref 0–0.2)
BASOPHILS NFR BLD AUTO: 0 %
BUN SERPL-SCNC: 17 MG/DL (ref 9–20)
CALCIUM SPEC-MCNC: 8.7 MG/DL (ref 8.4–10.2)
CHLORIDE SERPL-SCNC: 109 MMOL/L (ref 98–107)
CO2 SERPL-SCNC: 26 MMOL/L (ref 22–30)
EOSINOPHIL # BLD AUTO: 0.1 K/UL (ref 0–0.7)
EOSINOPHIL NFR BLD AUTO: 1 %
ERYTHROCYTE [DISTWIDTH] IN BLOOD BY AUTOMATED COUNT: 4.76 M/UL (ref 4.3–5.9)
ERYTHROCYTE [DISTWIDTH] IN BLOOD: 12.7 % (ref 11.5–15.5)
GLUCOSE SERPL-MCNC: 135 MG/DL (ref 74–99)
HCT VFR BLD AUTO: 44.7 % (ref 39–53)
HGB BLD-MCNC: 14.9 GM/DL (ref 13–17.5)
LYMPHOCYTES # SPEC AUTO: 0.8 K/UL (ref 1–4.8)
LYMPHOCYTES NFR SPEC AUTO: 8 %
MAGNESIUM SPEC-SCNC: 1.8 MG/DL (ref 1.6–2.3)
MCH RBC QN AUTO: 31.4 PG (ref 25–35)
MCHC RBC AUTO-ENTMCNC: 33.5 G/DL (ref 31–37)
MCV RBC AUTO: 93.8 FL (ref 80–100)
MONOCYTES # BLD AUTO: 0.5 K/UL (ref 0–1)
MONOCYTES NFR BLD AUTO: 5 %
NEUTROPHILS # BLD AUTO: 8.5 K/UL (ref 1.3–7.7)
NEUTROPHILS NFR BLD AUTO: 85 %
PLATELET # BLD AUTO: 147 K/UL (ref 150–450)
POTASSIUM SERPL-SCNC: 3 MMOL/L (ref 3.5–5.1)
SODIUM SERPL-SCNC: 144 MMOL/L (ref 137–145)
WBC # BLD AUTO: 10 K/UL (ref 4–11)

## 2021-01-19 PROCEDURE — 0TB03ZX EXCISION OF RIGHT KIDNEY, PERCUTANEOUS APPROACH, DIAGNOSTIC: ICD-10-PCS

## 2021-01-19 RX ADMIN — POTASSIUM CHLORIDE STA MEQ: 20 TABLET, EXTENDED RELEASE ORAL at 14:57

## 2021-01-19 RX ADMIN — HEPARIN SODIUM SCH: 5000 INJECTION, SOLUTION INTRAVENOUS; SUBCUTANEOUS at 08:30

## 2021-01-19 RX ADMIN — CEFAZOLIN SCH MLS/HR: 330 INJECTION, POWDER, FOR SOLUTION INTRAMUSCULAR; INTRAVENOUS at 22:31

## 2021-01-19 RX ADMIN — ONDANSETRON PRN MG: 2 INJECTION INTRAMUSCULAR; INTRAVENOUS at 12:31

## 2021-01-19 RX ADMIN — ACETAMINOPHEN PRN MG: 325 TABLET, FILM COATED ORAL at 23:35

## 2021-01-19 RX ADMIN — PANTOPRAZOLE SODIUM SCH MG: 40 TABLET, DELAYED RELEASE ORAL at 08:39

## 2021-01-19 RX ADMIN — POTASSIUM CHLORIDE STA MEQ: 20 TABLET, EXTENDED RELEASE ORAL at 14:16

## 2021-01-19 RX ADMIN — CEFAZOLIN SCH: 330 INJECTION, POWDER, FOR SOLUTION INTRAMUSCULAR; INTRAVENOUS at 05:28

## 2021-01-19 NOTE — P.PN
Pt was notified of negative culture and wet prep results, per JON Quezada PA-C.  Also reviewed below pap and HPV results. To repeat exam and pap in one year.   Subjective





This is a pleasant 20 years old male with no significant past medical history, 

he smokes marijuana and cigarettes.  Patient used to follow-up with Dr. Toledo 

pediatrician, he hasn't been seen her for a while however she does not have 

adult PCP.  He presents because of back pain to [facility] yesterday he says

that his pain on both sides on the lower third of his back, felt about 4-5/10 in

severity and currently there are 2-3, he felt these pains well he was waking up 

from sleep.  At [facility] as per patient he had CT of the abdomen and 

pelvis which was unremarkable, there he was told he has acute kidney injury and 

they discharged him with recommendation to follow up as an outpatient however he

decided to come to this hospital at [facility].  On further questionnaire patient 

states he has diarrhea about 4-5 times per day he says that he had a 4 couple 

days, they are soft rather than watery associated with vomiting and , abdominal 

cramps,.  Periumbilical to about 6-7/10 in severity, nonradiating improved with 

defecation, now he is abdominal pain free.


Patient vitals are stable.  Labs showing WBCs of 13.3 K, less of CBC is unre

markable, creatinine up at 1.9.  Urinalysis showing 2+ protein, trace ketones, 

trace blood, rare bacteria.





01/15/2021


Patient has no urinary complaints, no dysuria or urgency.  No suprapubic pain or

tenderness.  No abdominal pain.  He didn't have bowel movement since yesterday. 

He still complaining of from mild low back pain on both sides since yesterday.  

No diarrhea, no nausea vomiting, no abdominal pain.  No fever.  Blood pressure 

is 158/100, patient is afebrile.


WBCs trending down slowly to 12.3 K, creatinine is trending up 1.9  to 4.3 

today.  Urine from yesterday showing 2+ protein.  Urine c.  reatinine is 42, 

protein/creatinine ratio is within the reference range of 1.8.  PNA screen is 

positive.  Double-strand DNA antibodies is negative and anti-DNA antibodies less

than 1.  hepatitis BS antigen and hepatitis BS antibodies are nonreactive 


 renal ultrasound:increased echogenicity of the right kidney is nonspecific 

could be seen with medical renal disease, correlated clinically.  No evidence of

obstruction.  Nephrologist team on the case and follow the patient closely





01/16/2021


Patient with known new complaint today, yesterday he was started on a prednisone

and since then his back pain is improved and patient is happy about that, he has

slow appetite but no abdominal pain, nausea urinary symptoms, blood pressure is 

on the high side, Norvasc was started yesterday and hydralazine is admitted 

today by nephrology team.


Also he is on normal saline at 70 mL/h


Labs today show a stable creatinine of 4.4 and patient is aware.  His WBC is 

11.2 but also is on steroids.


Given his protein in the urine and positive CLIFF acute glomerulonephritis is 

suspected and kidney biopsy is scheduled for Monday.





01/17/2021


Patient was complaining of from low back pain and asking for stronger pain 

medication, Norco has been added, no other specific complaint.  Vitals are 

stable and blood pressure is 119/78


Labs from today are still pending, creatinine yesterday was 4.4.


Acute abdomen nephritis is suspected and patient is a scheduled for kidney 

biopsy tomorrow per nephrologist


Serology tests are pending CA) CA and immunofixation antibody and the serum and

urine 





01/18/2021


Patient with minimal lower back pain, no other complaint.  Vitals stable, blood 

pressure 151/89, last eye was 138/70. 


Creatinine today is trending down to 2.4.  Potassium 3.4, WBC is 11.9 K 


 IV fluid was lowered to 50 mL/h per nephrologist, continue with prednisone, 

patient is a scheduled for kidney biopsy today and I talked to the patient and 

he looks agreeable to this plan.





01/19/2021


Patient was going for kidney biopsy yesterday but procedure was consult because 

of uncontrolled hypertension, but systolic blood pressure this morning is 140s 

to 130s, most likely there is some examined T elements as patient looks anxious,

patient remains on hydralazine and Norvasc, Ativan is provided for him for 

relaxation.  Patient is planned to go to another trial for kidney biopsy today. 

He is the biopsy to rule out glomerulonephritis.  However his creatinine is a 

trending down to 1.7


Patient currently is maintained on prednisone 60 mg daily as well as 

normocephalic 50 L/h.  He is on Norvasc 5 mg twice daily and hydralazine 75 mg 3

times a day.





Objective





- Vital Signs


Vital signs: 


                                   Vital Signs











Temp  98.1 F   01/19/21 08:26


 


Pulse  114 H  01/19/21 11:15


 


Resp  16   01/19/21 11:15


 


BP  134/69   01/19/21 11:15


 


Pulse Ox  95   01/19/21 11:15








                                 Intake & Output











 01/18/21 01/19/21 01/19/21





 18:59 06:59 18:59


 


Intake Total 470 240 


 


Balance 470 240 


 


Intake:   


 


  Intake, IV Titration 470  





  Amount   


 


    Desmopressin Acetate 29 50  





    mcg In Sodium Chloride 0.   





    9% 50 ml @ 200 mls/hr   





    IVPB ONCE ONE Rx#:   





    [id number]   


 


    Sodium Chloride 0.9% 1, 420  





    000 ml @ 50 mls/hr IV .   





    Q20H [facility] Rx#:[id number]   


 


  Oral  240 


 


Other:   


 


  Voiding Method Toilet Toilet Toilet


 


  # Voids 3 2 














- Exam





GENERAL: The patient is alert and oriented x3, not in any acute distress. Well 

developed, well nourished. 


HEENT: Pupils are round and equally reacting to light. EOMI. No scleral icterus.

No conjunctival pallor. Normocephalic, atraumatic. No pharyngeal erythema. No 

thyromegaly. 


CARDIOVASCULAR: S1 and S2 present. No murmurs, rubs, or gallops. 


PULMONARY: Chest is clear to auscultation, no wheezing or crackles. 


ABDOMEN: Soft, nontender, nondistended, normoactive bowel sounds. No palpable 

organomegaly. 


MUSCULOSKELETAL: No joint swelling or deformity. 


EXTREMITIES: No cyanosis, clubbing, or pedal edema. 


NEUROLOGICAL: Gross neurological examination did not reveal any focal deficits. 


SKIN: No rashes. no petechiae.





- Labs


CBC & Chem 7: 


                                 01/18/21 07:29





                                 01/19/21 03:33


Labs: 


                  Abnormal Lab Results - Last 24 Hours (Table)











  01/19/21 Range/Units





  03:33 


 


Potassium  3.0 L  (3.5-5.1)  mmol/L


 


Chloride  109 H  ()  mmol/L


 


Creatinine  1.77 H  (0.66-1.25)  mg/dL


 


Glucose  135 H  (74-99)  mg/dL














Assessment and Plan


Assessment: 








Acute kidney injury, mostly related to Glomerulonephritis 


Proteinuria


Possible recent acute gastroenteritis, mostly viral.


High blood pressure, hypertension.








Plan: 





This is a pleasant 20 years old male who presents with acute kidney injury and 

proteinuria.  Continue with IV hydration at 70ml per hour, monitor creatinine 

and electrolytes Follow-up recommendation by nephrologist, patient is scheduled 

for kidney biopsy on Monday .   Continue with prednisone as per nephrologist


Labs and medication were reviewed..  Continue same treatment.  Continue with 

symptomatic treatment.  Resume home medication.  Monitor lytes and vitals.  DVT 

and GI prophylaxis.  Further recommendations depends on the clinical course of 

the patient


DVT prophylaxis: Subcutaneous heparin


GI Prophylaxis: Pepcid

## 2021-01-19 NOTE — P.PN
Subjective





Patient is seen in follow for acute kidney injury.  Creatinine peaked at 4.48 

this admission and is down to 1.77 today.  He is maintained on steroids.  CLIFF 

positive.  No gross hematuria.  Oral intake fair.  Kidney biopsy today.  

Canceled yesterday due to high blood pressure.





Vital signs are stable.


General: The patient appeared well nourished and normally developed. 


HEENT: Head exam is unremarkable. Neck is without jugular venous distension.


LUNGS: Breath sounds decreased.


HEART: Rate and Rhythm are regular. 


ABDOMEN: Soft, nontender.


EXTREMITITES: No edema.





Objective





- Vital Signs


Vital signs: 


                                   Vital Signs











Temp  98.1 F   01/19/21 08:26


 


Pulse  83   01/19/21 08:26


 


Resp  16   01/19/21 08:26


 


BP  136/66   01/19/21 08:26


 


Pulse Ox  97   01/19/21 08:26








                                 Intake & Output











 01/18/21 01/19/21 01/19/21





 18:59 06:59 18:59


 


Intake Total 470 240 


 


Balance 470 240 


 


Intake:   


 


  Intake, IV Titration 470  





  Amount   


 


    Desmopressin Acetate 29 50  





    mcg In Sodium Chloride 0.   





    9% 50 ml @ 200 mls/hr   





    IVPB ONCE ONE Rx#:   





    [id number]   


 


    Sodium Chloride 0.9% 1, 420  





    000 ml @ 50 mls/hr IV .   





    Q20H [facility] Rx#:[id number]   


 


  Oral  240 


 


Other:   


 


  Voiding Method Toilet Toilet 


 


  # Voids 3 2 














- Labs


CBC & Chem 7: 


                                 01/18/21 07:29





                                 01/19/21 03:33


Labs: 


                  Abnormal Lab Results - Last 24 Hours (Table)











  01/19/21 Range/Units





  03:33 


 


Potassium  3.0 L  (3.5-5.1)  mmol/L


 


Chloride  109 H  ()  mmol/L


 


Creatinine  1.77 H  (0.66-1.25)  mg/dL


 


Glucose  135 H  (74-99)  mg/dL














Assessment and Plan


Plan: 





Assessment:


1.  Acute kidney injury, concern for GN.  Creatinine peaked at 4.48 - 1.77 toda

y.  It was 1.9 on admission 01/13/2021.  Labs from today pending.  CLIFF positive.

 UPC 1.8 g. No hydronephrosis and kidney ultrasound.


2.  Benign hypertension.  Exacerbated by steroids.  Currently controlled.


3.  Hypokalemia from saline diuresis and steroids.  Magnesium normal.





Plan:


Maintain normal saline at rest serologies negative.  50 mL an hour.


Maintain prednisone (started 01/15/2021)


Maintain PPI.


Kidney biopsy today.


DDAVP prior to biopsy.


Maintain current antihypertensives.  


Replace potassium.  40 mEq now.

## 2021-01-19 NOTE — CT
EXAMINATION TYPE: CT biopsy renal RT

 

DATE OF EXAM: 1/19/2021

 

COMPARISON: NONE

 

HISTORY: KENNY, proteinuria

 

CT DLP: 660 mGycm

 

The procedure was explained to the patient.  The risks, complications, benefits, and alternatives wer
e discussed and any questions were answered.  Informed consent was obtained.  Patient was placed pron
e on the CT table and prepped and draped in the usual sterile fashion.  

 

Utilizing CT guidance, an 18 gauge core biopsy needle access into the right renal cortex was achieved
 and three 18 gauge core samples were obtained.  The patient was stable throughout the procedure and 
remained stable upon discharge.

 

IMPRESSION: Successful 18 gauge core biopsy of the kidney function.

## 2021-01-20 VITALS — DIASTOLIC BLOOD PRESSURE: 72 MMHG | RESPIRATION RATE: 19 BRPM | HEART RATE: 108 BPM | SYSTOLIC BLOOD PRESSURE: 133 MMHG

## 2021-01-20 VITALS — TEMPERATURE: 97.8 F

## 2021-01-20 LAB
ALBUMIN SERPL-MCNC: 4.5 G/DL (ref 3.8–4.9)
ALBUMIN/GLOB SERPL: 2.37 G/DL (ref 1.6–3.17)
ALP SERPL-CCNC: 60 U/L (ref 41–126)
ALT SERPL-CCNC: 67 U/L (ref 10–49)
ANION GAP SERPL CALC-SCNC: 13.3 MMOL/L (ref 4–12)
AST SERPL-CCNC: 32 U/L (ref 14–35)
BASOPHILS # BLD AUTO: 0.1 K/UL (ref 0–0.2)
BASOPHILS NFR BLD AUTO: 0 %
BUN SERPL-SCNC: 19 MG/DL (ref 9–27)
BUN/CREAT SERPL: 12.67 RATIO (ref 12–20)
CALCIUM SPEC-MCNC: 9 MG/DL (ref 8.7–10.3)
CHLORIDE SERPL-SCNC: 107 MMOL/L (ref 96–109)
CO2 SERPL-SCNC: 22.7 MMOL/L (ref 21.6–31.8)
EOSINOPHIL # BLD AUTO: 0.2 K/UL (ref 0–0.7)
EOSINOPHIL NFR BLD AUTO: 1 %
ERYTHROCYTE [DISTWIDTH] IN BLOOD BY AUTOMATED COUNT: 4.93 M/UL (ref 4.3–5.9)
ERYTHROCYTE [DISTWIDTH] IN BLOOD: 12.1 % (ref 11.5–15.5)
GLOBULIN SER CALC-MCNC: 1.9 G/DL (ref 1.6–3.3)
GLUCOSE SERPL-MCNC: 90 MG/DL (ref 70–110)
HCT VFR BLD AUTO: 45 % (ref 39–53)
HGB BLD-MCNC: 15.7 GM/DL (ref 13–17.5)
LYMPHOCYTES # SPEC AUTO: 2.3 K/UL (ref 1–4.8)
LYMPHOCYTES NFR SPEC AUTO: 16 %
MAGNESIUM SPEC-SCNC: 1.6 MG/DL (ref 1.5–2.4)
MCH RBC QN AUTO: 32 PG (ref 25–35)
MCHC RBC AUTO-ENTMCNC: 35 G/DL (ref 31–37)
MCV RBC AUTO: 91.3 FL (ref 80–100)
MONOCYTES # BLD AUTO: 0.9 K/UL (ref 0–1)
MONOCYTES NFR BLD AUTO: 6 %
NEUTROPHILS # BLD AUTO: 10.7 K/UL (ref 1.3–7.7)
NEUTROPHILS NFR BLD AUTO: 75 %
PLATELET # BLD AUTO: 175 K/UL (ref 150–450)
POTASSIUM SERPL-SCNC: 3.5 MMOL/L (ref 3.5–5.5)
PROT SERPL-MCNC: 6.4 G/DL (ref 6.2–8.2)
SODIUM SERPL-SCNC: 143 MMOL/L (ref 135–145)
WBC # BLD AUTO: 14.3 K/UL (ref 4–11)

## 2021-01-20 RX ADMIN — ACETAMINOPHEN PRN MG: 325 TABLET, FILM COATED ORAL at 06:07

## 2021-01-20 RX ADMIN — PANTOPRAZOLE SODIUM SCH MG: 40 TABLET, DELAYED RELEASE ORAL at 09:53

## 2021-01-20 NOTE — P.PN
Subjective





Patient is seen in follow for acute kidney injury.  Creatinine peaked at 4.48 

this admission and is down to 1.5 today.  He is maintained on steroids.  CLIFF 

positive.  No gross hematuria.  Oral intake fair.  Underwent kidney biopsy 

yesterday.  However only one sample was obtained due to hematoma and high blood 

pressure.  Hemoglobin stable.





Vital signs are stable.


General: The patient appeared well nourished and normally developed. 


HEENT: Head exam is unremarkable. Neck is without jugular venous distension.


LUNGS: Breath sounds decreased.


HEART: Rate and Rhythm are regular. 


ABDOMEN: Soft, nontender.


EXTREMITITES: No edema.





Objective





- Vital Signs


Vital signs: 


                                   Vital Signs











Temp  97.8 F   01/20/21 07:16


 


Pulse  80   01/20/21 07:16


 


Resp  16   01/20/21 07:16


 


BP  152/80   01/20/21 07:16


 


Pulse Ox  97   01/20/21 07:16








                                 Intake & Output











 01/19/21 01/20/21 01/20/21





 18:59 06:59 18:59


 


Intake Total 950 480 


 


Output Total 900 900 


 


Balance 50 -420 


 


Intake:   


 


    


 


    Desmopressin Acetate 28 50  





    mcg In Sodium Chloride 0.   





    9% 50 ml @ 200 mls/hr   





    IVPB ONCE ONE Rx#:   





    [id number]   


 


    Sodium Chloride 0.9% 1, 400  





    000 ml @ 50 mls/hr IV .   





    Q20H [facility] Rx#:[id number]   


 


  Oral 500 480 


 


Output:   


 


  Urine 900 900 


 


Other:   


 


  Voiding Method Toilet Toilet 


 


  # Voids  1 














- Labs


CBC & Chem 7: 


                                 01/20/21 10:04





                                 01/20/21 06:02


Labs: 


                  Abnormal Lab Results - Last 24 Hours (Table)











  01/19/21 01/19/21 01/20/21 Range/Units





  12:03 20:14 06:02 


 


WBC     (4.0-11.0)  k/uL


 


Plt Count   147 L   (150-450)  k/uL


 


Neutrophils #   8.5 H   (1.3-7.7)  k/uL


 


Lymphocytes #   0.8 L   (1.0-4.8)  k/uL


 


Potassium  3.4 L    (3.5-5.1)  mmol/L


 


Anion Gap    13.30 H  (4.00-12.00)  mmol/L


 


ALT    67 H  (10-49)  U/L














  01/20/21 Range/Units





  10:04 


 


WBC  14.3 H  (4.0-11.0)  k/uL


 


Plt Count   (150-450)  k/uL


 


Neutrophils #  10.7 H  (1.3-7.7)  k/uL


 


Lymphocytes #   (1.0-4.8)  k/uL


 


Potassium   (3.5-5.1)  mmol/L


 


Anion Gap   (4.00-12.00)  mmol/L


 


ALT   (10-49)  U/L














Assessment and Plan


Plan: 





Assessment:


1.  Acute kidney injury, concern for GN.  Creatinine peaked at 4.48 - 1.5 today.

 It was 1.9 on admission 01/13/2021.  CLIFF positive.  UPC 1.8 g. No 

hydronephrosis and kidney ultrasound.


2.  Benign hypertension.  Exacerbated by steroids. 


3.  Hypokalemia from saline diuresis and steroids.  Magnesium normal.





Plan:


Maintain normal saline at 50 mL an hour.


Maintain prednisone (started 01/15/2021)


Maintain PPI.


Kidney biopsy results pending.


Replace potassium.  20 mEq now.


Decrease hydralazine to 50 mg twice daily.


Add losartan 50 mg once daily.


Stable to discharge home from nephrology standpoint.  Repeat BMP and magnesium 

level 2-3 days postdischarge.  Follow up outpatient within 1 week.

## 2021-01-21 NOTE — P.DS
Providers


Date of admission: 


01/16/21 08:20





Attending physician: 


Skyler Graff





Consults: 





                                        





01/13/21 17:09


Consult Physician Routine 


   Consulting Provider: Sapna Renner


   Consult Reason/Comments: KENNY, proteinuria


   Do you want consulting provider notified?: Yes











Primary care physician: 


Rosy Toledo





[facility] Course: 





Diagnoses:


Acute kidney injury, mostly related to Glomerulonephritis .  Creatinine is back 

to a syringe upon discharge


Proteinuria


High blood pressure, hypertension,  Exacerbated by use of steroids 





Hospital course:


This is a pleasant 20 years old male with no significant past medical history, h

e smokes marijuana and cigarettes.  Patient used to follow-up with Dr. Toledo 

pediatrician, he hasn't been seen her for a while however she does not have 

adult PCP.  He presents because of back pain to [facility] yesterday he says

that his pain on both sides on the lower third of his back, on admission his 

creatinine was elevated at 1.9, and at [facility] was 1.8, Urinalysis 

showing 2+ protein, next day  creatinine went up from 1.9 to 4.3, nephrologist 

evaluated the patient's from day 1.  Renal ultrasound showed no hydronephrosis. 

Patient was started on prednisone 60 mg daily and since then his creatinine was 

trending down gradually and on the day of discharge his creatinine was 1.5 which

is within the reference range for this patient.  Also kidney biopsy was 

recommended by nephrologist and done on 1/20 by interventional radiologist.  

Results are still pending upon discharge


Patient developed high blood pressure, probably exacerbated or induced by 

steroids, he was discharged on Norvasc 5 mg twice a day, hydralazine 50 mg twice

daily and losartan 50 mg daily as recommended by nephrologist


On the day of discharge patient is back to baseline with no urinary symptoms, no

back pain.  He denies other symptoms he denies chest pain, no dyspnea.  No 

nausea vomiting.  No abdominal pain.  No change in urine or bowel habits.  No 

fever 


patient was cleared for discharge by nephrologist today


Patient will be discharged on prednisone 60 mg daily until he sees Dr. Gipson in

the office as recommended by Dr. Gipson himself, he wanted to see him in one 

week, but the soonest appointment we got was 2/3 and I talked to the staff Mrs. Hughes and she is going to talk to Dr. Gipson to get him sooner appointment 

and patient informed about these recommendation and he is going to contact the 

nephrology office by himself.


Problems and management plan were discussed with the patient and he verbalized 

understanding and acceptance


Patient was found stable and can be discharged homein guarded prognosis  however

he needs follow-up as an outpatient. Patient was instructed to follow up with 

PCP within one week and patient agrees.  Patient hadn't pediatric PCP and he 

needed new adult PCP and he agrees to go to Dr. Izaguirre and he agrees with the 

appointments made for him on 1/22 and states he will follow-up


Also I called and discussed the case with Dr. Izaguirre with a recommendation to 

follow-up kidney biopsy, nephrology follow-up, need for follow-up on his steroid

prescription and high blood pressure and Dr. Arredondo kindly took notes for these 

recommendation





Physical exam 


Gen: patient is a AAOx3, no distress


CVS: S1-S2, RRR, no murmur


Lungs: B/L CTA, no wheezing


Abdomen: soft, no distention, no tenderness, positive bowel sounds


Extremity: no leg edema or induration





Time spent more than 35 minutes








Patient Condition at Discharge: Fair





Plan - Discharge Summary


New Discharge Prescriptions: 


New


   hydrALAZINE HCL [Apresoline] 50 mg PO BID #120 tab


   Losartan [Cozaar] 50 mg PO DAILY #30 tab


   predniSONE [Deltasone] 60 mg PO DAILY 30 Days #30 tab


   Pantoprazole [Protonix] 40 mg PO AC-BRKFST #30 tablet.


   Acetaminophen Tab [Tylenol] 650 mg PO Q6HR PRN  tab


     PRN Reason: Fever And/ Or Pain


   amLODIPine [Norvasc] 5 mg PO BID #60 tab


Discharge Medication List





Acetaminophen Tab [Tylenol] 650 mg PO Q6HR PRN  tab 01/20/21 [Rx]


Losartan [Cozaar] 50 mg PO DAILY #30 tab 01/20/21 [Rx]


Pantoprazole [Protonix] 40 mg PO AC-BRKFST #30 tablet. 01/20/21 [Rx]


amLODIPine [Norvasc] 5 mg PO BID #60 tab 01/20/21 [Rx]


hydrALAZINE HCL [Apresoline] 50 mg PO BID #120 tab 01/20/21 [Rx]


predniSONE [Deltasone] 60 mg PO DAILY 30 Days #30 tab 01/20/21 [Rx]








Follow up Appointment(s)/Referral(s): 


Reji Arredondo MD [REFERRING] - 01/22/21 9:00 am


(Your new adult Primary care doctor


check you BMP ( basic metabolic panel) and magnesium level and CBC (completed 

blood count ) with your doctor )


Rosy Toledo MD [Primary Care Provider] - 1-2 days


Prem Giposn DO [STAFF PHYSICIAN] - 02/05/21 10:20 am


(Please call the office and ask for billing and give billing your insurance 

information.


Office also will try to get to a sooner appointmentwithin one week.  Please call

the office for follow-up)


Ambulatory/Diagnostic Orders: 


Basic Metabolic Panel [LAB.AMB] Time Frame: 3 Days, Location: None Selected


Complete Blood Count w/diff [LAB.AMB] Time Frame: 3 Days, Location: None 

Selected


Patient Instructions/Handouts:  Acute Kidney Injury (DC), Hypertension (DC), 

Glomerulonephritis (GEN)


Activity/Diet/Wound Care/Special Instructions: 


Low carbohydrate diet








Activity is restricted until you see your doctor


Discharge Disposition: HOME SELF-CARE

## 2021-01-25 NOTE — CDI
Documentation Clarification Form



Date: 01/25/2021 02:43:00 PM

From: Laura Peralta

Phone: : If you have a question about this query, please contact Yoly Ríos  at [phone] between 8am and 5pm

MRN: [id number]

Admit Date: 01/16/2021 08:20:00 AM

Patient Name: Vel Mcallister

Visit Number: [id number]

Discharge Date:  01/20/2021 04:16:00 PM



ATTENTION: The Clinical Documentation Specialists (CDI) and [facility] Coding Staff 
appreciate your assistance in clarifying documentation. Please respond to the 
clarification below the line at the bottom and electronically sign. The CDI & 
[facility] Coding staff will review the response and follow-up if needed. Please note: 
Queries are made part of the Legal Health Record. If you have any questions, 
please contact the author of this message via ITS.



Dr. Mathur Sheet



The final diagnosis of the pathology report states: Acute Tubular Injury



Documentation states: Glomerulonephritis

Patient history/risk factors: KENNY, HTN, Tobacco



Clinical Indicators: KENNY, CLIFF positive 

Labs: BUN 15, 20, 21- Creatinine 1.90, 4.37, 4.48

Treatment: IV hydration at 150 mm per hour, monitor creatinine and electrolytes



In your professional opinion, do you agree with the pathology report specifying 
KENNY as acute tubular injury?  

Yes

No

Other (please specify) ___________________

Unable to determine

___________________________________________________________________________

pathology report : acute tubular injury ( reported after been discharge) pt 
supposed to follow up with his PCP and nephrologist upon discharge as instructed
[email or web address]